# Patient Record
Sex: MALE | Race: WHITE | NOT HISPANIC OR LATINO | Employment: FULL TIME | URBAN - METROPOLITAN AREA
[De-identification: names, ages, dates, MRNs, and addresses within clinical notes are randomized per-mention and may not be internally consistent; named-entity substitution may affect disease eponyms.]

---

## 2023-10-02 PROBLEM — C44.329 SQUAMOUS CELL CARCINOMA OF SKIN OF OTHER PARTS OF FACE: Status: ACTIVE | Noted: 2023-08-02

## 2023-10-02 PROBLEM — L82.1 OTHER SEBORRHEIC KERATOSIS: Status: ACTIVE | Noted: 2023-08-02

## 2023-10-02 PROBLEM — C44.311 BASAL CELL CARCINOMA OF SKIN OF NOSE: Status: ACTIVE | Noted: 2023-08-02

## 2023-10-02 PROBLEM — D04.62 CARCINOMA IN SITU OF SKIN OF LEFT UPPER LIMB, INCLUDING SHOULDER: Status: ACTIVE | Noted: 2023-08-02

## 2023-10-02 PROBLEM — L81.4 OTHER MELANIN HYPERPIGMENTATION: Status: ACTIVE | Noted: 2023-08-02

## 2023-10-02 PROBLEM — D49.2 NEOPLASM OF UNSPECIFIED BEHAVIOR OF BONE, SOFT TISSUE, AND SKIN: Status: ACTIVE | Noted: 2023-08-02

## 2023-10-02 PROBLEM — Z85.828 PERSONAL HISTORY OF OTHER MALIGNANT NEOPLASM OF SKIN: Status: ACTIVE | Noted: 2023-08-02

## 2023-10-02 PROBLEM — D04.39 CARCINOMA IN SITU OF SKIN OF OTHER PARTS OF FACE: Status: ACTIVE | Noted: 2023-08-02

## 2023-10-02 PROBLEM — D84.9 IMMUNOSUPPRESSION (MULTI): Status: ACTIVE | Noted: 2023-10-02

## 2023-10-02 PROBLEM — N52.9 ERECTILE DYSFUNCTION: Status: ACTIVE | Noted: 2023-10-02

## 2023-10-02 PROBLEM — L57.0 ACTINIC KERATOSIS: Status: ACTIVE | Noted: 2023-08-02

## 2023-10-02 PROBLEM — L90.5 SCAR CONDITION AND FIBROSIS OF SKIN: Status: ACTIVE | Noted: 2023-08-02

## 2023-10-02 PROBLEM — D18.01 HEMANGIOMA OF SKIN AND SUBCUTANEOUS TISSUE: Status: ACTIVE | Noted: 2023-08-02

## 2023-10-02 PROBLEM — B00.9 HSV-2 (HERPES SIMPLEX VIRUS 2) INFECTION: Status: ACTIVE | Noted: 2023-10-02

## 2023-10-02 PROBLEM — C43.59 MALIGNANT MELANOMA OF OTHER PART OF TRUNK (MULTI): Status: ACTIVE | Noted: 2023-08-02

## 2023-10-02 PROBLEM — L71.9 ROSACEA, UNSPECIFIED: Status: ACTIVE | Noted: 2023-08-02

## 2023-10-02 PROBLEM — C43.61 MALIGNANT MELANOMA OF RIGHT UPPER LIMB, INCLUDING SHOULDER (MULTI): Status: ACTIVE | Noted: 2023-08-02

## 2023-10-02 PROBLEM — L57.9 SKIN CHANGES DUE TO CHRONIC EXPOSURE TO NONIONIZING RADIATION, UNSPECIFIED: Status: ACTIVE | Noted: 2023-08-02

## 2023-10-02 RX ORDER — MUPIROCIN 20 MG/G
1 OINTMENT TOPICAL
COMMUNITY
Start: 2020-05-27 | End: 2023-10-11 | Stop reason: WASHOUT

## 2023-10-02 RX ORDER — VITS A,C,E/LUTEIN/MINERALS 300MCG-200
1 TABLET ORAL DAILY
COMMUNITY

## 2023-10-02 RX ORDER — FLUOCINONIDE 0.5 MG/G
OINTMENT TOPICAL 2 TIMES DAILY
COMMUNITY
Start: 2021-09-27

## 2023-10-02 RX ORDER — ATORVASTATIN CALCIUM 20 MG/1
1 TABLET, FILM COATED ORAL NIGHTLY
COMMUNITY
Start: 2018-03-07

## 2023-10-02 RX ORDER — VALACYCLOVIR HYDROCHLORIDE 500 MG/1
500 TABLET, FILM COATED ORAL
COMMUNITY
Start: 2020-11-17 | End: 2023-10-11 | Stop reason: WASHOUT

## 2023-10-02 RX ORDER — TACROLIMUS 0.5 MG/1
0.5 CAPSULE ORAL EVERY 12 HOURS
COMMUNITY
End: 2023-10-11 | Stop reason: SDUPTHER

## 2023-10-02 RX ORDER — DOXYCYCLINE 100 MG/1
100 CAPSULE ORAL
COMMUNITY
Start: 2019-07-05 | End: 2023-10-11 | Stop reason: WASHOUT

## 2023-10-02 RX ORDER — FLUOROURACIL 50 MG/G
1 CREAM TOPICAL
COMMUNITY
Start: 2018-03-08 | End: 2023-10-11 | Stop reason: WASHOUT

## 2023-10-02 RX ORDER — MYCOPHENOLATE MOFETIL 250 MG/1
750 CAPSULE ORAL EVERY 12 HOURS
COMMUNITY
Start: 2015-11-11 | End: 2023-10-11 | Stop reason: SDUPTHER

## 2023-10-02 RX ORDER — HUMAN INSULIN 100 [IU]/ML
5 INJECTION, SOLUTION SUBCUTANEOUS
COMMUNITY
Start: 2015-11-11

## 2023-10-02 RX ORDER — AMLODIPINE BESYLATE 10 MG/1
1 TABLET ORAL DAILY
COMMUNITY
Start: 2015-11-03 | End: 2023-10-11 | Stop reason: SDUPTHER

## 2023-10-02 RX ORDER — SILDENAFIL 100 MG/1
TABLET, FILM COATED ORAL AS NEEDED
COMMUNITY
Start: 2015-11-11 | End: 2023-10-11 | Stop reason: SDUPTHER

## 2023-10-02 RX ORDER — CARVEDILOL 25 MG/1
1 TABLET ORAL 2 TIMES DAILY
COMMUNITY
Start: 2016-05-24

## 2023-10-11 ENCOUNTER — APPOINTMENT (OUTPATIENT)
Dept: TRANSPLANT | Facility: CLINIC | Age: 73
End: 2023-10-11

## 2023-10-11 ENCOUNTER — OFFICE VISIT (OUTPATIENT)
Dept: TRANSPLANT | Facility: CLINIC | Age: 73
End: 2023-10-11
Payer: MEDICARE

## 2023-10-11 VITALS
DIASTOLIC BLOOD PRESSURE: 62 MMHG | WEIGHT: 183.6 LBS | OXYGEN SATURATION: 98 % | TEMPERATURE: 97.8 F | BODY MASS INDEX: 27.92 KG/M2 | SYSTOLIC BLOOD PRESSURE: 105 MMHG | HEART RATE: 50 BPM

## 2023-10-11 DIAGNOSIS — Z94.0 KIDNEY REPLACED BY TRANSPLANT (HHS-HCC): Primary | ICD-10-CM

## 2023-10-11 DIAGNOSIS — Z94.0 IMMUNOSUPPRESSIVE MANAGEMENT ENCOUNTER FOLLOWING KIDNEY TRANSPLANT (HHS-HCC): ICD-10-CM

## 2023-10-11 DIAGNOSIS — N52.9 ERECTILE DYSFUNCTION, UNSPECIFIED ERECTILE DYSFUNCTION TYPE: ICD-10-CM

## 2023-10-11 DIAGNOSIS — Z79.899 IMMUNOSUPPRESSIVE MANAGEMENT ENCOUNTER FOLLOWING KIDNEY TRANSPLANT (HHS-HCC): ICD-10-CM

## 2023-10-11 DIAGNOSIS — I10 ESSENTIAL HYPERTENSION: ICD-10-CM

## 2023-10-11 PROCEDURE — 1126F AMNT PAIN NOTED NONE PRSNT: CPT | Performed by: INTERNAL MEDICINE

## 2023-10-11 PROCEDURE — 3066F NEPHROPATHY DOC TX: CPT | Performed by: INTERNAL MEDICINE

## 2023-10-11 PROCEDURE — 99214 OFFICE O/P EST MOD 30 MIN: CPT | Performed by: INTERNAL MEDICINE

## 2023-10-11 PROCEDURE — 1036F TOBACCO NON-USER: CPT | Performed by: INTERNAL MEDICINE

## 2023-10-11 PROCEDURE — 1159F MED LIST DOCD IN RCRD: CPT | Performed by: INTERNAL MEDICINE

## 2023-10-11 PROCEDURE — 3078F DIAST BP <80 MM HG: CPT | Performed by: INTERNAL MEDICINE

## 2023-10-11 PROCEDURE — 3074F SYST BP LT 130 MM HG: CPT | Performed by: INTERNAL MEDICINE

## 2023-10-11 RX ORDER — SILDENAFIL 100 MG/1
100 TABLET, FILM COATED ORAL DAILY PRN
Qty: 30 TABLET | Refills: 3 | Status: SHIPPED | OUTPATIENT
Start: 2023-10-11 | End: 2024-10-10

## 2023-10-11 RX ORDER — TACROLIMUS 0.5 MG/1
0.5 CAPSULE ORAL EVERY 12 HOURS
Qty: 180 CAPSULE | Refills: 3 | Status: SHIPPED | OUTPATIENT
Start: 2023-10-11 | End: 2024-10-10

## 2023-10-11 RX ORDER — AMLODIPINE BESYLATE 10 MG/1
10 TABLET ORAL DAILY
Qty: 90 TABLET | Refills: 3 | Status: SHIPPED | OUTPATIENT
Start: 2023-10-11 | End: 2024-10-10

## 2023-10-11 RX ORDER — MYCOPHENOLATE MOFETIL 250 MG/1
750 CAPSULE ORAL EVERY 12 HOURS
Qty: 540 CAPSULE | Refills: 3 | Status: SHIPPED | OUTPATIENT
Start: 2023-10-11 | End: 2024-10-10

## 2023-10-11 ASSESSMENT — ENCOUNTER SYMPTOMS
DEPRESSION: 0
OCCASIONAL FEELINGS OF UNSTEADINESS: 0
LOSS OF SENSATION IN FEET: 0

## 2023-10-11 NOTE — PROGRESS NOTES
TRANSPLANT NEPHROLOGY :   OUTPATIENT CLINIC NOTE        SERVICE DATE: 10/11/2023   SERVICE TIME:  10:19 AM    REASON FOR VISIT/CHIEF COMPLAINT:  S/P  TRANSPLANT SURGERY  IMMUNOSUPPRESSIVE MEDICATION MANAGEMENT  BLOOD PRESSURE MANAGEMENT    HPI:    Mr. Smith is a 73 y.o. male with past medical history significant for diabetes, hypertension ,dyslipidemia and End Stage Renal Disease secondary to presumed diabetic nephropathy and hypertensive nephrosclerosis. Patient underwent a living unrelated renal transplant on 7/11/2013. Baseline creatinine had been around 1.3-1.6.     Patient recently diagnosed with melanoma at his back s/p surgery. He is due for 2nd MOH surgery in September.     Patient is doing well overall. No new complaints. Denied chest pain, SOB, MEDRANO, Palpitation. Normal urination and bowel movement. Normal gait and no weakness of arms/legs. No cough, runny nose, sore throat, cold symptoms, or rash. No hearing loss. Normal vision.No problems with his sleep, mood and function. No recent infection, hospitalization, surgery or ER visits.      ROS:  Review of  14 systems was performed system by system. See HPI. Otherwise, the symptoms were negative.    PAST MEDICAL HISTORY:  No past medical history on file.     PAST SURGICAL HISTORY:  Past Surgical History:   Procedure Laterality Date    US GUIDED ASPIRATION OF ABSCESS, HEMATOMA, CYST  7/29/2013    US GUIDED ASPIRATION OF ABSCESS, HEMATOMA, CYST 7/29/2013 [facility] [place]    US GUIDED ASPIRATION OF ABSCESS, HEMATOMA, CYST  7/29/2013    US GUIDED ASPIRATION OF ABSCESS, HEMATOMA, CYST 7/29/2013 [facility] [place] [facility]        SOCIAL HISTORY:  Social History     Socioeconomic History    Marital status: Single     Spouse name: Not on file    Number of children: Not on file    Years of education: Not on file    Highest education level: Not on file   Occupational History    Not on file   Tobacco Use    Smoking status: Never    Smokeless tobacco: Never   Substance and  Sexual Activity    Alcohol use: Not Currently    Drug use: Never    Sexual activity: Not on file   Other Topics Concern    Not on file   Social History Narrative    Not on file     Social Determinants of Health     Financial Resource Strain: Not on file   Food Insecurity: Not on file   Transportation Needs: Not on file   Physical Activity: Not on file   Stress: Not on file   Social Connections: Not on file   Intimate Partner Violence: Not on file   Housing Stability: Not on file       FAMILY HISTORY:  No family history on file.    MEDICATION LIST:  Current Outpatient Medications   Medication Instructions    amLODIPine (Norvasc) 10 mg tablet 1 tablet, oral, Daily    atorvastatin (Lipitor) 20 mg tablet 1 tablet, oral, Nightly    carvedilol (Coreg) 25 mg tablet 1 tablet, oral, 2 times daily    doxycycline (MONODOX) 100 mg    fluocinonide (Lidex) 0.05 % ointment Topical, 2 times daily,  APPLY SPARINGLY TO AFFECTED AREA(S)     fluorouracil (Efudex) 5 % cream 1 Application    mupirocin (Bactroban) 2 % ointment 1 Application    mycophenolate (CELLCEPT) 750 mg, oral, Every 12 hours    NovoLIN R Regular U100 Insulin 5 Units, subcutaneous, 3 times daily before meals, plus sliding scale    sildenafil (Viagra) 100 mg tablet oral, As needed    tacrolimus (PROGRAF) 0.5 mg, oral, Every 12 hours    valACYclovir (VALTREX) 500 mg    vit A,C and E-lutein-minerals (Ocuvite with Lutein) 300 mcg-200 mg-27 mg-2 mg tablet 1 tablet, oral, Daily         PHYSICAL EXAM:    Visit Vitals  /62   Pulse 50   Temp 36.6 °C (97.8 °F) (Temporal)   Wt 83.3 kg (183 lb 9.6 oz)   SpO2 98%   BMI 27.92 kg/m²   Smoking Status Never   BSA 2 m²          Vital signs - reviewed. Acceptable BP at this office visit.   General Appearance - NAD, Good speech, oriented and alert  HEENT - Supple. Not pale. No jaundice. No cervical lymphadenopathy. Pharynx and tonsils are not injected.  CVS - RRR. Normal S1/S2. No murmur, click , rub or gallop  Lungs- clear to  "auscultation bilaterally  Abdomen - soft , not tender, no guarding, no rigidity. No hepatosplenomegaly. Normal bowel sounds. No masses and ascites. S/P Kidney transplant .  Transplanted kidney is not tender.   Musculoskeletal /Extremities - no edema. Full ROM. No joint tenderness.   Neuro/Psych - appropriate mood and affect. Motor power V/V all extremities. CN I -XII were grossly intact.  Skin - No visible rash      LABS:    No results found for: \"WBC\", \"HGB\", \"HCT\", \"PLT\", \"CHOL\", \"TRIG\", \"HDL\", \"LDLDIRECT\", \"ALT\", \"AST\", \"NA\", \"K\", \"CL\", \"CREATININE\", \"BUN\", \"CO2\", \"TSH\", \"PSA\", \"INR\", \"GLUF\", \"HGBA1C\", \"ALBUR\"  par    ASSESSMENT AND PLAN:    Mr. Smith is a 73 y.o. male  who is here for follow up s/p kidney transplant.    TRANSPLANT DATE: 7/11/2013 (Kidney)      1. ESRD S/P kidney transplant   - Creatinine last check was :  No results found for: \"CREATININE\"  Creatinine clearance cannot be calculated (No successful lab value found.)    - Renal allograft function is stable, with Cr 1.4 per patient report. He did lab last month, but no result available in Care Everywhere. Will follow up the result.   -Random urine protein/creatinine ratio -done yesterday. Will follow up the result.  -Ensure adequate hydration  - Avoid nephrotoxic medications, NSAIDs, and IV contrast.    2. Immunosuppression  -Tacrolimus level last check was 5.4  -Continue current immunosuppression regimen.    3. Electrolytes  No results found for: \"GLUCOSE\", \"CALCIUM\", \"NA\", \"K\", \"CO2\", \"CL\", \"BUN\", \"CREATININE\"  -Acceptable from last lab drawn    4. Hypertension  Blood Pressures         10/11/2023  1004             BP: 105/62          -Home  BP had been acceptable  -Encourage to monitor home BP  -Continue current anti hypertensive medication    5. Bone Mineral Disease/Osteoporosis  -check VIT D, PTH with next lab  - Consider DEXA every 2-3 years , defer to PCP    6.Anemia  No results found for: \"WBC\", \"HGB\", \"HCT\", \"MCV\", \"PLT\"  -asymptomatic  - " Continue to monitor  -check iron studies and ferritin. Will consider LESTER as needed.  - No indications for blood transfusion     7.Health maintenance and vaccination  - Flu shot during flu season annually  - Cancer screening is up to date per the patient    Lab : Routine transplant lab ( CBC, RFP, and anti-rejection trough level ) every 3 months  Additional labs:  VIT D, PTH with next lab    Additional Plan :  He did lab last month, but no result available in Care Everywhere. Will follow up the result.   -Random urine protein/creatinine ratio -done yesterday. Will follow up the result.    RTC 12  months    Yolanda Painting    Transplant Nephrology

## 2023-11-15 ENCOUNTER — OFFICE VISIT (OUTPATIENT)
Dept: DERMATOLOGY | Facility: CLINIC | Age: 73
End: 2023-11-15
Payer: MEDICARE

## 2023-11-15 VITALS — HEART RATE: 64 BPM | DIASTOLIC BLOOD PRESSURE: 57 MMHG | SYSTOLIC BLOOD PRESSURE: 113 MMHG

## 2023-11-15 DIAGNOSIS — C44.42 SQUAMOUS CELL CARCINOMA OF SKIN OF SCALP AND NECK: ICD-10-CM

## 2023-11-15 PROCEDURE — 3066F NEPHROPATHY DOC TX: CPT | Performed by: DERMATOLOGY

## 2023-11-15 PROCEDURE — 1159F MED LIST DOCD IN RCRD: CPT | Performed by: DERMATOLOGY

## 2023-11-15 PROCEDURE — 99214 OFFICE O/P EST MOD 30 MIN: CPT | Performed by: DERMATOLOGY

## 2023-11-15 PROCEDURE — 3078F DIAST BP <80 MM HG: CPT | Performed by: DERMATOLOGY

## 2023-11-15 PROCEDURE — 3074F SYST BP LT 130 MM HG: CPT | Performed by: DERMATOLOGY

## 2023-11-15 PROCEDURE — 17311 MOHS 1 STAGE H/N/HF/G: CPT | Performed by: DERMATOLOGY

## 2023-11-15 PROCEDURE — 1126F AMNT PAIN NOTED NONE PRSNT: CPT | Performed by: DERMATOLOGY

## 2023-11-15 PROCEDURE — 1036F TOBACCO NON-USER: CPT | Performed by: DERMATOLOGY

## 2023-11-15 NOTE — PROGRESS NOTES
Office Visit Note  Date: 11/15/2023  Surgeon:  Rianer Rob MD  Office Location: [address]   [address]  [facility] [address]  Dept: [phone]  Dept Fax: [phone]  Referring Provider: Harry Alvarez MD  [id number]  [place] [address]    Subjective   Rich Smith is a 73 y.o. male who presents for the following: MOHS Surgery    According to the patient, the lesion has been presnt for approximately greater than 1 year at the time of diagnosis.  The lesion is not causing symptoms.  The lesion has not been treated previously.    The patient does not have a pacemaker / defibrillator.  The patient does not have a heart valve / joint replacement.    The patient is not on blood thinners.  The patient does not have a history of hepatitis B or C.  The patient does not have a history of HIV.    Review of Systems:  No other skin or systemic complaints other than what is documented elsewhere in the note.    MEDICAL HISTORY: clinically relevant history including significant past medical history, medications and allergies was reviewed and documented in Epic.    Objective   Well appearing patient in no apparent distress; mood and affect are within normal limits.  Vital signs: See record.  Noted on the     The patient confirmed the identified site.    Discussion:  The nature of the diagnosis was explained. The lesion is a skin cancer.  It has a risk of local growth and distant spread. The condition is associated with sun exposure.  Warning signs of non-melanoma skin cancer discussed. Patient was instructed to perform monthly self skin examination.  We recommended that the patient have regular full skin exams given an increased risk of subsequent skin cancers. The patient was instructed to use sun protective behaviors including use of broad spectrum sunscreens and sun protective clothing to reduce risk of skin cancers.      Risks, benefits,  side effects of Mohs surgery were discussed with patient and the patient voiced understanding.  It was explained that even though the cure rate of Mohs is very high it is not 100%. Risks of surgery including but not limited to bleeding, infection, numbness, nerve damage, and scar were reviewed.  Discussion included wound care requirements, activity restrictions, likely scar outcome and time to heal.     After Mohs surgery, the defect may need to be repaired surgically and the scar may be longer than the original lesion.  Reconstruction options, risks, and benefits were reviewed including second intention healing, linear repair (4-1 ratio was explained), local flaps, skin grafts, cartilage grafts and interpolation flaps (the need for multiple surgeries was explained). Possible outcomes were reviewed including likely scar appearance, failure of flap survival, infection, bleeding and the need for revision surgery.     The pathology was reviewed, the photograph was reviewed, and the referring physician's note was reviewed.    Patient elected for Mohs surgery.    The patient has a squamous cell carcinoma.  The pathology was reviewed, the photograph was reviewed, and the referring physicians note was reviewed.  Multiple treatment options including mohs surgery (which has moderate risk of morbidity) were reviewed.    Medical Decision Making  Column 1- Squamous Cell Carcinoma (1 acute uncomplicated illness- Low)  Column 2- 3 tests reviewed (pathology, photograph, referring physician notes- Moderate)  Column 3- Modertate risk of morbidity from additional treatment- mohs surgry- Moderate)    Overall Moderate MDM

## 2023-11-15 NOTE — PROGRESS NOTES
Mohs Surgery Operative Note    Date of Surgery:  11/15/2023  Surgeon:  Rainer Rob MD  Office Location: [address]   [address] [address]  Dept: [phone]  Dept Fax: [phone]  Referring Provider: Harry Alvarez MD  [id number]  [place] [address]      Assessment/Plan   Pre-procedure:   Obtained informed consent: written from patient  The surgical site was identified and confirmed with the patient.     Intra-operative:   Audible time out called at : 2:31 PM 11/15/23  by: Stacey Stearns RN   Verified patient name, birthdate, site, specimen bottle label & requisition.    The planned procedure(s) was again reviewed with the patient. The risks of bleeding, infection, nerve damage and scarring were reviewed. Written authorization was obtained. The patient identity, surgical site, and planned procedure(s) were verified. The provider acted as both surgeon and pathologist.     Squamous cell carcinoma of skin of scalp and neck  Left Vertex Scalp    Mohs surgery    Consent obtained: written    Universal Protocol:  Procedure explained and questions answered to patient or proxy's satisfaction: Yes    Test results available and properly labeled: Yes    Pathology report reviewed: Yes    External notes reviewed: Yes    Photo or diagram used for site identification: Yes    Site/side marked: Yes    Slide independently reviewed by Mohs surgeon: Yes    Immediately prior to procedure a time out was called: Yes    Patient identity confirmed: verbally with patient  Preparation: Patient was prepped and draped in usual sterile fashion      Anticoagulation:  Is the patient taking prescription anticoagulant and/or aspirin prescribed/recommended by a physician? No    Was the anticoagulation regimen changed prior to Mohs? No      Anesthesia:  Anesthesia method: local infiltration  Local anesthetic: lidocaine 2% WITH epi    Procedure  Details:  Biopsy accession number: [id number]  Date of biopsy: 4/18/2023  Pre-Op diagnosis: squamous cell carcinoma  SCC subtype: moderately differentiated  Surgery side: left  Surgical site (from skin exam): Left Vertex Scalp  Pre-operative length (cm): 1.5  Pre-operative width (cm): 1.3  Indications for Mohs surgery: anatomic location where tissue conservation is critical    Micrographic Surgery Details:  Post-operative length (cm): 1.8  Post-operative width (cm): 1.6  Number of Mohs stages: 1    Stage 1     Comments: The patient was brought into the operating room and placed in the procedure chair in the appropriate position.  The area positive by previous biopsy was identified and confirmed with the patient. The area of clinically obvious tumor was debulked using a curette and/or scalpel as needed. An incision was made following the Mohs approach through the skin. The specimen was taken to the lab, divided into 2 piece(s) and appropriately chromacoded and processed.             Tumor features identified on Mohs section: no tumor identified    Depth of defect: subcutaneous fat    Patient tolerance of procedure: tolerated well, no immediate complications    Reconstruction:  Was the defect reconstructed?: No    Fine/surface layer approximation (top stitches)   Post-procedure details: sterile dressing applied and wound care instructions given                  Wound care was discussed, and the patient was given written post-operative wound care instructions.      The patient will follow up with Rainer Rob MD as needed for any post operative problems or concerns, and will follow up with their primary dermatologist as scheduled.

## 2023-11-28 ENCOUNTER — OFFICE VISIT (OUTPATIENT)
Dept: DERMATOLOGY | Facility: CLINIC | Age: 73
End: 2023-11-28
Payer: MEDICARE

## 2023-11-28 DIAGNOSIS — C44.329 SQUAMOUS CELL CANCER OF SKIN OF LEFT CHEEK: Primary | ICD-10-CM

## 2023-11-28 PROCEDURE — 99214 OFFICE O/P EST MOD 30 MIN: CPT | Performed by: DERMATOLOGY

## 2023-11-28 PROCEDURE — 1126F AMNT PAIN NOTED NONE PRSNT: CPT | Performed by: DERMATOLOGY

## 2023-11-28 PROCEDURE — 1159F MED LIST DOCD IN RCRD: CPT | Performed by: DERMATOLOGY

## 2023-11-28 PROCEDURE — 3066F NEPHROPATHY DOC TX: CPT | Performed by: DERMATOLOGY

## 2023-11-28 PROCEDURE — 13132 CMPLX RPR F/C/C/M/N/AX/G/H/F: CPT | Performed by: DERMATOLOGY

## 2023-11-28 PROCEDURE — 1036F TOBACCO NON-USER: CPT | Performed by: DERMATOLOGY

## 2023-11-28 PROCEDURE — 17311 MOHS 1 STAGE H/N/HF/G: CPT | Performed by: DERMATOLOGY

## 2023-11-28 NOTE — PROGRESS NOTES
Office Visit Note  Date: 11/28/2023  Surgeon:  Rainer Rob MD  Office Location: [address]   [address]  [facility] [address]  Dept: [phone]  Dept Fax: [phone]  Referring Provider: Harry Alvarez MD  [id number]  [place] [address]    Subjective   Rich Smith is a 73 y.o. male who presents for the following: MOHS Surgery    According to the patient, the lesion has been presnt for approximately 6 months at the time of diagnosis.  The lesion is not causing symptoms.  The lesion has not been treated previously.    The patient does not have a pacemaker / defibrillator.  The patient does not have a heart valve / joint replacement.    The patient is not on blood thinners.  The patient does not have a history of hepatitis B or C.  The patient does not have a history of HIV.    Review of Systems:  No other skin or systemic complaints other than what is documented elsewhere in the note.    MEDICAL HISTORY: clinically relevant history including significant past medical history, medications and allergies was reviewed and documented in Epic.    Objective   Well appearing patient in no apparent distress; mood and affect are within normal limits.  Vital signs: See record.  Noted on the Left lateral cheek  Is a 2.5 x 2.0 cm scar        The patient confirmed the identified site.    Discussion:  The nature of the diagnosis was explained. The lesion is a skin cancer.  It has a risk of local growth and distant spread. The condition is associated with sun exposure.  Warning signs of non-melanoma skin cancer discussed. Patient was instructed to perform monthly self skin examination.  We recommended that the patient have regular full skin exams given an increased risk of subsequent skin cancers. The patient was instructed to use sun protective behaviors including use of broad spectrum sunscreens and sun protective clothing to reduce risk of  skin cancers.      Risks, benefits, side effects of Mohs surgery were discussed with patient and the patient voiced understanding.  It was explained that even though the cure rate of Mohs is very high it is not 100%. Risks of surgery including but not limited to bleeding, infection, numbness, nerve damage, and scar were reviewed.  Discussion included wound care requirements, activity restrictions, likely scar outcome and time to heal.     After Mohs surgery, the defect may need to be repaired surgically and the scar may be longer than the original lesion.  Reconstruction options, risks, and benefits were reviewed including second intention healing, linear repair (4-1 ratio was explained), local flaps, skin grafts, cartilage grafts and interpolation flaps (the need for multiple surgeries was explained). Possible outcomes were reviewed including likely scar appearance, failure of flap survival, infection, bleeding and the need for revision surgery.     The pathology was reviewed, the photograph was reviewed, and the referring physician's note was reviewed.    Patient elected for Mohs surgery.    The patient has a squamous cell carcinoma.  The pathology was reviewed, the photograph was reviewed, and the referring physicians note was reviewed.  Multiple treatment options including mohs surgery (which has moderate risk of morbidity) were reviewed.    Medical Decision Making  Column 1- Squamous Cell Carcinoma (1 acute uncomplicated illness- Low)  Column 2- 3 tests reviewed (pathology, photograph, referring physician notes- Moderate)  Column 3- Modertate risk of morbidity from additional treatment- mohs surgry- Moderate)    Overall Moderate MDM

## 2023-11-29 NOTE — PROGRESS NOTES
Mohs Surgery Operative Note    Date of Surgery:  11/28/2023  Surgeon:  Rainer Rob MD  Office Location: [address]   [address] [address]  Dept: [phone]  Dept Fax: [phone]  Referring Provider: Harry Alvarez MD  [id number]  [place] [address]      Assessment/Plan   Pre-procedure:   Obtained informed consent: written from patient  The surgical site was identified and confirmed with the patient.     Intra-operative:   Audible time out called at : 7:49 AM 11/29/23  by: Carol Fan MA   Verified patient name, birthdate, site, specimen bottle label & requisition.    The planned procedure(s) was again reviewed with the patient. The risks of bleeding, infection, nerve damage and scarring were reviewed. Written authorization was obtained. The patient identity, surgical site, and planned procedure(s) were verified. The provider acted as both surgeon and pathologist.     Squamous cell carcinoma of skin  Left lateral cheek    Mohs surgery    Consent obtained: written    Universal Protocol:  Procedure explained and questions answered to patient or proxy's satisfaction: Yes    Test results available and properly labeled: Yes    Pathology report reviewed: Yes    External notes reviewed: Yes    Photo or diagram used for site identification: Yes    Site/side marked: Yes    Slide independently reviewed by Mohs surgeon: Yes    Immediately prior to procedure a time out was called: Yes    Patient identity confirmed: verbally with patient  Preparation: Patient was prepped and draped in usual sterile fashion      Anticoagulation:  Is the patient taking prescription anticoagulant and/or aspirin prescribed/recommended by a physician? No    Was the anticoagulation regimen changed prior to Mohs? No      Anesthesia:  Anesthesia method: local infiltration  Local anesthetic: lidocaine 2% WITH epi    Procedure Details:  Biopsy accession  number: [id number]  Date of biopsy: 4/18/2023  Pre-Op diagnosis: squamous cell carcinoma  SCC subtype: in situ  Surgery side: left  Surgical site (from skin exam): Left lateral cheek  Pre-operative length (cm): 2.5  Pre-operative width (cm): 2  Indications for Mohs surgery: anatomic location where tissue conservation is critical  Previously treated? No      Micrographic Surgery Details:  Post-operative length (cm): 2.5  Post-operative width (cm): 2  Number of Mohs stages: 1    Stage 1     Comments: The patient was brought into the operating room and placed in the procedure chair in the appropriate position.  The area positive by previous biopsy was identified and confirmed with the patient. The area of clinically obvious tumor was debulked using a curette and/or scalpel as needed. An incision was made following the Mohs approach through the skin. The specimen was taken to the lab, divided into 2 piece(s) and appropriately chromacoded and processed.                 Tumor features identified on Mohs section: no tumor identified    Depth of defect: subcutaneous fat    Patient tolerance of procedure: tolerated well, no immediate complications    Reconstruction:  Was the defect reconstructed? Yes    Was reconstruction performed by the same Mohs surgeon? Yes    Setting of reconstruction: outpatient office  When was reconstruction performed? same day  Type of reconstruction: linear  Linear reconstruction: complex  Length of linear repair (cm): 7  Subcutaneous Layers (Deep Stitches)   Suture size:  3-0 and 4-0  Suture type:  Vicryl  Stitches:  Buried vertical mattress  Fine/surface layer approximation (top stitches)   Epidermal/Superficial suture size:  5-0  Epidermal/Superficial suture type:  Fast-absorbing gut  Stitches: simple running    Hemostasis achieved with: electrodesiccation  Outcome: patient tolerated procedure well with no complications    Post-procedure details: sterile dressing applied and wound care  instructions given    Dressing type: Telfa pad, petrolatum and pressure dressing      Staff Communication: Dermatology Local Anesthesia: Site Location:Left Lateral Cheek 2%Lidocaine / Epinephrine - Amount: 12 CC        Complex Linear Repair - Wide Undermining:  Given the location and size of the defect, it was determined that a complex layered linear closure was required to restore normal anatomy and function. The repair was considered complex because extensive undermining was required and performed. The amount of undermining performed was greater than the maximum width of the defect as measured perpendicular to the closure line along at least one entire edge of the defect. Standing cutaneous cones were removed using Burow's triangles. The wound edges were brought into close approximation with buried vertical mattress sutures. The remainder of the wound was then closed with epidermal top sutures.                    The final repair measured 7 cm    Wound care was discussed, and the patient was given written post-operative wound care instructions.      The patient will follow up with Rainer Rob MD as needed for any post operative problems or concerns, and will follow up with their primary dermatologist as scheduled.

## 2023-12-19 ENCOUNTER — OFFICE VISIT (OUTPATIENT)
Dept: DERMATOLOGY | Facility: CLINIC | Age: 73
End: 2023-12-19
Payer: MEDICARE

## 2023-12-19 DIAGNOSIS — L57.0 ACTINIC KERATOSIS: ICD-10-CM

## 2023-12-19 DIAGNOSIS — D18.01 HEMANGIOMA OF SKIN: ICD-10-CM

## 2023-12-19 DIAGNOSIS — L81.4 OTHER MELANIN HYPERPIGMENTATION: ICD-10-CM

## 2023-12-19 DIAGNOSIS — D22.5 MELANOCYTIC NEVI OF TRUNK: ICD-10-CM

## 2023-12-19 DIAGNOSIS — L82.1 SEBORRHEIC KERATOSIS: ICD-10-CM

## 2023-12-19 DIAGNOSIS — D48.5 NEOPLASM OF UNCERTAIN BEHAVIOR OF SKIN: Primary | ICD-10-CM

## 2023-12-19 PROCEDURE — 1159F MED LIST DOCD IN RCRD: CPT | Performed by: STUDENT IN AN ORGANIZED HEALTH CARE EDUCATION/TRAINING PROGRAM

## 2023-12-19 PROCEDURE — 88305 TISSUE EXAM BY PATHOLOGIST: CPT | Performed by: DERMATOLOGY

## 2023-12-19 PROCEDURE — 88305 TISSUE EXAM BY PATHOLOGIST: CPT | Mod: TC,DER | Performed by: STUDENT IN AN ORGANIZED HEALTH CARE EDUCATION/TRAINING PROGRAM

## 2023-12-19 PROCEDURE — 1126F AMNT PAIN NOTED NONE PRSNT: CPT | Performed by: STUDENT IN AN ORGANIZED HEALTH CARE EDUCATION/TRAINING PROGRAM

## 2023-12-19 PROCEDURE — 1036F TOBACCO NON-USER: CPT | Performed by: STUDENT IN AN ORGANIZED HEALTH CARE EDUCATION/TRAINING PROGRAM

## 2023-12-19 PROCEDURE — 17004 DESTROY PREMAL LESIONS 15/>: CPT | Performed by: STUDENT IN AN ORGANIZED HEALTH CARE EDUCATION/TRAINING PROGRAM

## 2023-12-19 PROCEDURE — 3066F NEPHROPATHY DOC TX: CPT | Performed by: STUDENT IN AN ORGANIZED HEALTH CARE EDUCATION/TRAINING PROGRAM

## 2023-12-19 PROCEDURE — 11102 TANGNTL BX SKIN SINGLE LES: CPT | Performed by: STUDENT IN AN ORGANIZED HEALTH CARE EDUCATION/TRAINING PROGRAM

## 2023-12-19 PROCEDURE — 99214 OFFICE O/P EST MOD 30 MIN: CPT | Performed by: STUDENT IN AN ORGANIZED HEALTH CARE EDUCATION/TRAINING PROGRAM

## 2023-12-19 NOTE — PROGRESS NOTES
Subjective     Rich Smith is a 73 y.o. male who presents for the following: Skin Check (FBSE). He has no lesions of concern    Review of Systems:  No other skin or systemic complaints other than what is documented elsewhere in the note.    The following portions of the chart were reviewed this encounter and updated as appropriate:         Skin Cancer History  Melanoma, most recently 5/2023  Multiple NMSC        Specialty Problems          Dermatology Problems    Basal cell carcinoma (BCC) in situ of skin    Malignant melanoma of skin (CMS/HCC)    Actinic keratosis    Carcinoma in situ of skin of left upper limb, including shoulder    Carcinoma in situ of skin of other parts of face    Hemangioma of skin and subcutaneous tissue    Malignant melanoma of other part of trunk (CMS/HCC)    Malignant melanoma of right upper limb, including shoulder (CMS/HCC)    Other melanin hyperpigmentation    Other seborrheic keratosis    Personal history of other malignant neoplasm of skin    Rosacea, unspecified    Scar condition and fibrosis of skin    Skin changes due to chronic exposure to nonionizing radiation, unspecified    Squamous cell carcinoma of skin of other parts of face        Objective   Well appearing patient in no apparent distress; mood and affect are within normal limits.    A full examination was performed including scalp, head, eyes, ears, nose, lips, neck, chest, axillae, abdomen, back, buttocks, bilateral upper extremities, bilateral lower extremities, hands, feet, fingers, toes, fingernails, and toenails. All findings within normal limits unless otherwise noted below.    Assessment/Plan   1. Neoplasm of uncertain behavior of skin  Right Eyebrow  Shiny pink papule on right lateral eyebrow          Lesion biopsy  Type of biopsy: tangential    Informed consent: discussed and consent obtained    Timeout: patient name, date of birth, surgical site, and procedure verified    Procedure prep:  Patient was prepped  and draped  Anesthesia: the lesion was anesthetized in a standard fashion    Anesthetic:  1% lidocaine w/ epinephrine 1-100,000 local infiltration  Instrument used: DermaBlade    Hemostasis achieved with: aluminum chloride    Outcome: patient tolerated procedure well    Post-procedure details: sterile dressing applied and wound care instructions given    Dressing type: petrolatum and bandage      Specimen 1 - Dermatopathology- DERM LAB  Differential Diagnosis: r/o BCC  Check Margins Yes/No?:    Comments:    Dermpath Lab: No DIF, Routine Histopath, B-Cell or T-Cell Gene Rearrangement    2. Actinic keratosis (20)  Forearms (10), Scalp (5), Temple/Forehead (5)  Erythematous macules with gritty scale.    Destr of lesion - Forearms (2), Scalp, Temple/Forehead  Complexity: simple    Destruction method: cryotherapy    Informed consent: discussed and consent obtained    Lesion destroyed using liquid nitrogen: Yes    Cryotherapy cycles:  1  Outcome: patient tolerated procedure well with no complications    Post-procedure details: wound care instructions given     3. Seborrheic keratosis, Hemangioma of skin, Lentigo, Multiple benign melanocytic nevi  Stuck on verrucous, tan-brown papules and plaques.  Scattered cherry-red papule(s).  Scattered tan macules in sun-exposed areas.  Regular brown macules  Plan:  -Skin exam today notable for the following findings: seborrheic keratosis, lentigines, cherry angiomas, numerous nevi  - ABCDEs of melanoma were discussed and the ugly duckling sign. The appearance of non-melanoma skin cancers was also discussed.  - We recommend daily use of broad spectrum (UVA + UVB protection) sunscreen , with an SPF of 30 or higher. Reapply sunscreen frequently (at least every 2 hours), especially when outdoors or when active, and apply more than you think you need. Waterproof or sweatproof suscreens are important if you will be spending time swimming or doing strenuous activites outside. In addition,  we recommend avoidance of mid-day sun, during the hours of 10am to 3pm, and seeking shade when possible. The use of sun protective clothing, such as a wide-brimmed hat, is also recommended.    RTC in 4 months    Nicanor Harley MD  PGY-4 Dermatology      I was present during all key portions of visit including history, exam, discussion/plan and/or procedures and directly supervised our resident during all portions of the visit, follow up care, medications and more    Harry Alvarez MD

## 2023-12-21 LAB
LABORATORY COMMENT REPORT: NORMAL
PATH REPORT.FINAL DX SPEC: NORMAL
PATH REPORT.GROSS SPEC: NORMAL
PATH REPORT.MICROSCOPIC SPEC OTHER STN: NORMAL
PATH REPORT.RELEVANT HX SPEC: NORMAL
PATH REPORT.TOTAL CANCER: NORMAL

## 2024-03-05 ENCOUNTER — TELEPHONE (OUTPATIENT)
Dept: TRANSPLANT | Facility: HOSPITAL | Age: 74
End: 2024-03-05
Payer: MEDICARE

## 2024-03-05 DIAGNOSIS — N18.30 STAGE 3 CHRONIC KIDNEY DISEASE, UNSPECIFIED WHETHER STAGE 3A OR 3B CKD (MULTI): ICD-10-CM

## 2024-03-05 DIAGNOSIS — E08.22 DIABETES MELLITUS DUE TO UNDERLYING CONDITION WITH DIABETIC CHRONIC KIDNEY DISEASE, UNSPECIFIED CKD STAGE, UNSPECIFIED WHETHER LONG TERM INSULIN USE (MULTI): ICD-10-CM

## 2024-03-05 DIAGNOSIS — Z94.0 KIDNEY REPLACED BY TRANSPLANT (HHS-HCC): ICD-10-CM

## 2024-03-05 NOTE — TELEPHONE ENCOUNTER
Received call from [facility] requesting labs faxed over for patient  Fax: [phone] labs faxed

## 2024-03-27 LAB
ALBUMIN (G/DL) IN SER/PLAS EXTERNAL: 4.2 G/DL
ANION GAP IN SER/PLAS EXTERNAL: 11 MMOL/L
CALCIUM (MG/DL) IN SER/PLAS EXTERNAL: 9.2 MG/DL
CARBON DIOXIDE, TOTAL (MMOL/L) IN SER/PLAS EXTERNAL: 26 MMOL/L
CHLORIDE (MMOL/L) IN SER/PLAS EXTERNAL: 109 MMOL/L
CREATININE (MG/DL) IN SER/PLAS EXTERNAL: 1.4 MG/DL
ERYTHROCYTE DISTRIBUTION WIDTH (RATIO) BY AUTOMATED COUNT EXTERNAL: NORMAL
ERYTHROCYTE MEAN CORPUSCULAR HEMOGLOBIN (PG) BY AUTOMATED COUNT EXTERNAL: NORMAL
ERYTHROCYTE MEAN CORPUSCULAR HGB CONCENTRATION (G/DL) BY AUTOMATED EXT: NORMAL
ERYTHROCYTE MEAN CORPUSCULAR VOLUME (FL) BY AUTOMATED COUNT EXTERNAL: NORMAL
ERYTHROCYTES (10*6/UL) IN BLOOD BY AUTOMATED COUNT EXTERNAL: NORMAL
GLOMERULAR FILTRATION RATE ML/MIN/1.73 SQ M.PREDICTED EXTERNAL: 53 ML/MIN/1.73M*2
GLUCOSE (MG/DL) IN SER/PLAS EXTERNAL: 85 MG/DL
HEMATOCRIT (%) IN BLOOD BY AUTOMATED COUNT EXTERNAL: 42.7 %
HEMOGLOBIN (G/DL) IN BLOOD EXTERNAL: 14.5 G/DL
LEUKOCYTES (10*3/UL) IN BLOOD BY AUTOMATED COUNT EXTERNAL: NORMAL
NRBC (PER 100 WBCS) BY AUTOMATED COUNT EXTERNAL: NORMAL
PHOSPHORUS: 2.6
PLATELET MEAN VOLUME (FL) IN BLOOD BY AUTOMATED COUNT EXTERNAL: NORMAL
PLATELETS (10*3/UL) IN BLOOD AUTOMATED COUNT EXTERNAL: 194 X10*3/UL
POTASSIUM (MMOL/L) IN SER/PLAS EXTERNAL: 4.4 MMOL/L
RBC: 5.02
SODIUM (MMOL/L) IN SER/PLAS EXTERNAL: 142 MMOL/L
UREA NITROGEN (MG/DL) IN SER/PLAS EXTERNAL: 26 MG/DL
WBC: 6.9

## 2024-04-16 ENCOUNTER — OFFICE VISIT (OUTPATIENT)
Dept: DERMATOLOGY | Facility: CLINIC | Age: 74
End: 2024-04-16
Payer: MEDICARE

## 2024-04-16 DIAGNOSIS — L85.3 XEROSIS CUTIS: ICD-10-CM

## 2024-04-16 DIAGNOSIS — L57.0 ACTINIC KERATOSIS: Primary | ICD-10-CM

## 2024-04-16 DIAGNOSIS — L82.1 SEBORRHEIC KERATOSIS: ICD-10-CM

## 2024-04-16 DIAGNOSIS — D22.9 MULTIPLE BENIGN NEVI: ICD-10-CM

## 2024-04-16 PROCEDURE — 99213 OFFICE O/P EST LOW 20 MIN: CPT | Performed by: STUDENT IN AN ORGANIZED HEALTH CARE EDUCATION/TRAINING PROGRAM

## 2024-04-16 PROCEDURE — 1159F MED LIST DOCD IN RCRD: CPT | Performed by: STUDENT IN AN ORGANIZED HEALTH CARE EDUCATION/TRAINING PROGRAM

## 2024-04-16 PROCEDURE — 17004 DESTROY PREMAL LESIONS 15/>: CPT | Performed by: STUDENT IN AN ORGANIZED HEALTH CARE EDUCATION/TRAINING PROGRAM

## 2024-04-16 ASSESSMENT — DERMATOLOGY QUALITY OF LIFE (QOL) ASSESSMENT
DATE THE QUALITY-OF-LIFE ASSESSMENT WAS COMPLETED: 66946
RATE HOW BOTHERED YOU ARE BY EFFECTS OF YOUR SKIN PROBLEMS ON YOUR ACTIVITIES (EG, GOING OUT, ACCOMPLISHING WHAT YOU WANT, WORK ACTIVITIES OR YOUR RELATIONSHIPS WITH OTHERS): 0 - NEVER BOTHERED
RATE HOW BOTHERED YOU ARE BY SYMPTOMS OF YOUR SKIN PROBLEM (EG, ITCHING, STINGING BURNING, HURTING OR SKIN IRRITATION): 0 - NEVER BOTHERED
WHAT SINGLE SKIN CONDITION LISTED BELOW IS THE PATIENT ANSWERING THE QUALITY-OF-LIFE ASSESSMENT QUESTIONS ABOUT: NONE OF THE ABOVE
RATE HOW EMOTIONALLY BOTHERED YOU ARE BY YOUR SKIN PROBLEM (FOR EXAMPLE, WORRY, EMBARRASSMENT, FRUSTRATION): 0 - NEVER BOTHERED
ARE THERE EXCLUSIONS OR EXCEPTIONS FOR THE QUALITY OF LIFE ASSESSMENT: NO

## 2024-04-16 ASSESSMENT — PATIENT GLOBAL ASSESSMENT (PGA): PATIENT GLOBAL ASSESSMENT: PATIENT GLOBAL ASSESSMENT:  1 - CLEAR

## 2024-04-16 ASSESSMENT — ITCH NUMERIC RATING SCALE: HOW SEVERE IS YOUR ITCHING?: 0

## 2024-04-16 ASSESSMENT — DERMATOLOGY PATIENT ASSESSMENT
DO YOU HAVE ANY NEW OR CHANGING LESIONS: NO
DO YOU USE SUNSCREEN: OCCASIONALLY
DO YOU USE A TANNING BED: NO
HAVE YOU HAD OR DO YOU HAVE A STAPH INFECTION: NO
ARE YOU AN ORGAN TRANSPLANT RECIPIENT: YES
HAVE YOU HAD OR DO YOU HAVE VASCULAR DISEASE: NO

## 2024-04-16 NOTE — PROGRESS NOTES
Subjective     Rich Smith is a 73 y.o. male who presents for the following: Skin Check (FBSE, no areas of concern. Hx of squamous cell carcinoma).     No particular concerns today. Noted discussion of changing transplant meds as below     Review of Systems:  No other skin or systemic complaints other than what is documented elsewhere in the note.    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    Malignant melanoma of skin (Multi)    Actinic keratosis    Carcinoma in situ of skin of left upper limb, including shoulder    Carcinoma in situ of skin of other parts of face    Hemangioma of skin and subcutaneous tissue    Malignant melanoma of other part of trunk (Multi)    Malignant melanoma of right upper limb, including shoulder (Multi)    Other melanin hyperpigmentation    Other seborrheic keratosis    Personal history of other malignant neoplasm of skin    Rosacea, unspecified    Scar condition and fibrosis of skin    Skin changes due to chronic exposure to nonionizing radiation, unspecified    Squamous cell carcinoma of skin of other parts of face        Objective   Well appearing patient in no apparent distress; mood and affect are within normal limits.    A full examination was performed including scalp, head, eyes, ears, nose, lips, neck, chest, axillae, abdomen, back, buttocks, bilateral upper extremities, bilateral lower extremities, hands, feet, fingers, toes, fingernails, and toenails. All findings within normal limits unless otherwise noted below.    No lymphadenopathy in the cervical, mandiublar, occipital, supraclav, axillary, inguinal, popliteal basins.     Assessment/Plan   1. Actinic  keratosis  Scalp  Numerous Erythematous macules with gritty scale on the scalp and face and ears    Actinic Keratoses - face, scalp.  The pre-cancerous nature of these lesions and treatment options were discussed with the patient today.  At this time, I recommend treatment with liquid nitrogen cryotherapy.  The patient expressed understanding, is in agreement with this plan, and wishes to proceed with cryotherapy today. Total of 16 lesions frozen today  - We also discussed efficacy of Nicotinamide/Niacinamide for skin cancer (NMSC, [place]) prevention - discussed that evidence was mixed for this (NEJM 2015 vs 2023) but low risk of side effects (facial flushing) and that it was a vitamin; patient is willing to trial, discussed he could purchase OTC as his insurance does not appear to cover this  - Also had lengthy discussion - patient notes that he has heard about a new IV immunosuppressive medication that reduces the risk of skin cancer compared to tacrolimus (Sirolimus? But not in IV formulation). Discussed that from a skin standpoint we were certainly willing to trial sirolimus due to reduced NMSC and melanoma risk but many other potential side effects of switching immunosuppression, discussed that he should discuss with transplant team as they would be managing this, he understood and will discuss w/ his transplant team, I think it is reasonable as he has had 2 invasive melanomas and multiple NMSCs       Destr of lesion - Scalp  Complexity: simple    Destruction method: cryotherapy    Informed consent: discussed and consent obtained    Lesion destroyed using liquid nitrogen: Yes    Cryotherapy cycles:  1  Outcome: patient tolerated procedure well with no complications    Post-procedure details: wound care instructions given      Related Procedures  Follow Up In Dermatology - Established Patient    2. Multiple benign nevi  Scattered on the patient's face, neck, trunk, and extremities, there are multiple small, round-  to oval-shaped, brown-pigmented and pink-colored, symmetric, uniform-appearing macules and dome-shaped papules     Clinically benign- to slightly atypical-appearing nevi - the clinically benign- to slightly atypical-appearing nature of the patient's nevi was discussed with the patient today.  None of the patient's nevi meet threshold for biopsy today.  I emphasized the importance of performing monthly self-skin exams using the ABCDs of monitoring moles, which were reviewed with the patient today and an informational hand-out provided.  I also emphasized the importance of sun avoidance and sun protection with daily sunscreen use.  The patient expressed understanding and is in agreement with this plan.      3. Seborrheic keratosis  Stuck on verrucous, tan-brown papules and plaques.      Seborrheic Keratoses - the benign nature of these lesions was discussed with the patient today and reassurance provided.  No treatment is medically indicated for these lesions at this time.      4. Xerosis cutis  Fine, ashy, pale to white scale diffusely over skin.    Xerosis.  I emphasized the importance of dry, sensitive skin care, including the use of a mild soap, such as Dove, and frequent and aggressive moisturization, at least twice daily and immediately following showers or baths, with recommended over-the-counter moisturizing creams, such as Eucerin, Cetaphil, Cerave, or Aveeno, or Vaseline or Aquaphor ointments.  The patient was also provided an informational hand-out today containing further details on dry skin care.        Rtc 4 months    I was present during all key portions of visit including history, exam, discussion/plan and/or procedures and directly supervised our resident during all portions of the visit, follow up care, medications and more    Harry Alvarez MD

## 2024-08-20 ENCOUNTER — APPOINTMENT (OUTPATIENT)
Dept: DERMATOLOGY | Facility: CLINIC | Age: 74
End: 2024-08-20
Payer: MEDICARE

## 2024-08-20 DIAGNOSIS — D48.5 NEOPLASM OF UNCERTAIN BEHAVIOR OF SKIN: Primary | ICD-10-CM

## 2024-08-20 DIAGNOSIS — D22.9 MULTIPLE BENIGN NEVI: ICD-10-CM

## 2024-08-20 DIAGNOSIS — L57.0 ACTINIC KERATOSIS: ICD-10-CM

## 2024-08-20 DIAGNOSIS — L82.1 SEBORRHEIC KERATOSIS: ICD-10-CM

## 2024-08-20 ASSESSMENT — DERMATOLOGY QUALITY OF LIFE (QOL) ASSESSMENT
RATE HOW EMOTIONALLY BOTHERED YOU ARE BY YOUR SKIN PROBLEM (FOR EXAMPLE, WORRY, EMBARRASSMENT, FRUSTRATION): 0 - NEVER BOTHERED
RATE HOW BOTHERED YOU ARE BY SYMPTOMS OF YOUR SKIN PROBLEM (EG, ITCHING, STINGING BURNING, HURTING OR SKIN IRRITATION): 0 - NEVER BOTHERED
ARE THERE EXCLUSIONS OR EXCEPTIONS FOR THE QUALITY OF LIFE ASSESSMENT: NO
RATE HOW BOTHERED YOU ARE BY EFFECTS OF YOUR SKIN PROBLEMS ON YOUR ACTIVITIES (EG, GOING OUT, ACCOMPLISHING WHAT YOU WANT, WORK ACTIVITIES OR YOUR RELATIONSHIPS WITH OTHERS): 0 - NEVER BOTHERED
DATE THE QUALITY-OF-LIFE ASSESSMENT WAS COMPLETED: 67072

## 2024-08-20 ASSESSMENT — ITCH NUMERIC RATING SCALE: HOW SEVERE IS YOUR ITCHING?: 0

## 2024-08-20 ASSESSMENT — PATIENT GLOBAL ASSESSMENT (PGA): PATIENT GLOBAL ASSESSMENT: PATIENT GLOBAL ASSESSMENT:  1 - CLEAR

## 2024-08-20 ASSESSMENT — DERMATOLOGY PATIENT ASSESSMENT: DO YOU HAVE ANY NEW OR CHANGING LESIONS: YES

## 2024-08-20 NOTE — PROGRESS NOTES
Subjective     Rich Smith is a 73 y.o. male who presents for the following: Skin Check (Pt has Hx of BCC, SCC, Melanoma.).     PMH of melanoma, multiple NMSCs, here for TBSE last seen 4/2024. Kidney transplant in 2013, currently on tacrolimus and cellcept.     Review of Systems:  No other skin or systemic complaints other than what is documented elsewhere in the note.    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    Malignant melanoma of skin (Multi)    Actinic keratosis    Carcinoma in situ of skin of left upper limb, including shoulder    Carcinoma in situ of skin of other parts of face    Hemangioma of skin and subcutaneous tissue    Malignant melanoma of other part of trunk (Multi)    Malignant melanoma of right upper limb, including shoulder (Multi)    Other melanin hyperpigmentation    Other seborrheic keratosis    Personal history of other malignant neoplasm of skin    Rosacea, unspecified    Scar condition and fibrosis of skin    Skin changes due to chronic exposure to nonionizing radiation, unspecified    Squamous cell carcinoma of skin of other parts of face     Past Medical History:  Rich Smith  has no past medical history on file.    Past Surgical History:  Rich Smith  has a past surgical history that includes US guided aspiration of abscess, hematoma, cyst (7/29/2013) and US guided aspiration of abscess, hematoma, cyst (7/29/2013).    Family History:  Patient family history is not on file.    Social History:  Rich Smith  reports that he has never smoked. He has never used smokeless tobacco. He reports that he does not currently use alcohol. He reports that he  does not use drugs.    Allergies:  Patient has no known allergies.    Current Medications / CAM's:    Current Outpatient Medications:     amLODIPine (Norvasc) 10 mg tablet, Take 1 tablet (10 mg) by mouth once daily., Disp: 90 tablet, Rfl: 3    atorvastatin (Lipitor) 20 mg tablet, Take 1 tablet (20 mg) by mouth once daily at bedtime., Disp: , Rfl:     carvedilol (Coreg) 25 mg tablet, Take 1 tablet (25 mg) by mouth 2 times a day., Disp: , Rfl:     fluocinonide (Lidex) 0.05 % ointment, Apply topically twice a day.  APPLY SPARINGLY TO AFFECTED AREA(S), Disp: , Rfl:     insulin regular (NovoLIN R Regular U100 Insulin) 100 unit/mL injection, Inject 5 Units under the skin 3 times a day before meals. plus sliding scale, Disp: , Rfl:     mycophenolate (Cellcept) 250 mg capsule, Take 3 capsules (750 mg) by mouth every 12 hours., Disp: 540 capsule, Rfl: 3    sildenafil (Viagra) 100 mg tablet, Take 1 tablet (100 mg) by mouth once daily as needed for erectile dysfunction., Disp: 30 tablet, Rfl: 3    tacrolimus (Prograf) 0.5 mg capsule, Take 1 capsule (0.5 mg) by mouth every 12 hours., Disp: 180 capsule, Rfl: 3    vit A,C and E-lutein-minerals (Ocuvite with Lutein) 300 mcg-200 mg-27 mg-2 mg tablet, Take 1 tablet by mouth once daily., Disp: , Rfl:      Objective   Well appearing patient in no apparent distress; mood and affect are within normal limits.    A full examination was performed including scalp, head, eyes, ears, nose, lips, neck, chest, axillae, abdomen, back, buttocks, bilateral upper extremities, bilateral lower extremities, hands, feet, fingers, toes, fingernails, and toenails. All findings within normal limits unless otherwise noted below.    Assessment/Plan   1. Neoplasm of uncertain behavior of skin  Left Upper Back  1.1 cm pink, shiny plaque          Lesion biopsy  Type of biopsy: tangential    Informed consent: discussed and consent obtained    Timeout: patient name, date of birth, surgical site, and procedure  verified    Procedure prep:  Patient was prepped and draped  Anesthesia: the lesion was anesthetized in a standard fashion    Anesthetic:  1% lidocaine w/ epinephrine 1-100,000 local infiltration  Instrument used: DermaBlade    Hemostasis achieved with: aluminum chloride    Outcome: patient tolerated procedure well    Post-procedure details: sterile dressing applied and wound care instructions given    Dressing type: petrolatum and bandage      Specimen 1 - Dermatopathology- DERM LAB  Differential Diagnosis: superficial BCC vs BLK  Check Margins Yes/No?:    Comments:    Dermpath Lab: Routine Histopathology (formalin-fixed tissue)    2. Actinic keratosis (16)  Head - Anterior (Face) (15), Scalp  Scattered on the patient's face and scalp, there are multiple erythematous, gritty, scaly macules      Actinic Keratoses - face, scalp.  The pre-cancerous nature of these lesions and treatment options were discussed with the patient today.  At this time, werecommend treatment with liquid nitrogen cryotherapy.  The patient expressed understanding, is in agreement with this plan, and wishes to proceed with cryotherapy today.   - Previously discussed efficacy of Nicotinamide/Niacinamide for skin cancer (NMSC, [place]) prevention - discussed that evidence was mixed for this (NEJM 2015 vs 2023) but low risk of side effects (facial flushing) and that it was a vitamin - patient did not trial the medication and not interested at this time.    Destr of lesion - Head - Anterior (Face) (15)  Complexity: simple    Destruction method: cryotherapy    Informed consent: discussed and consent obtained    Lesion destroyed using liquid nitrogen: Yes    Region frozen until ice ball extended beyond lesion: Yes    Cryotherapy cycles:  1  Outcome: patient tolerated procedure well with no complications    Post-procedure details: wound care instructions given      Related Procedures  Follow Up In Dermatology - Established Patient    3. Seborrheic  keratosis  Stuck on verrucous, tan-brown papules and plaques.      Seborrheic Keratoses - the benign nature of these lesions was discussed with the patient today and reassurance provided.  No treatment is medically indicated for these lesions at this time.      4. Multiple benign nevi  Scattered on the patient's face, neck, trunk, and extremities, there are multiple small, round- to oval-shaped, brown-pigmented and pink-colored, symmetric, uniform-appearing macules and dome-shaped papules     Clinically benign- to slightly atypical-appearing nevi - the clinically benign- to slightly atypical-appearing nature of the patient's nevi was discussed with the patient today.  None of the patient's nevi meet threshold for biopsy today. We emphasized the importance of performing monthly self-skin exams using the ABCDs of monitoring moles, which were reviewed with the patient today and an informational hand-out provided. We also emphasized the importance of sun avoidance and sun protection with daily sunscreen use.  The patient expressed understanding and is in agreement with this plan.        Isidoro Calderon DO  PGY-4 Dermatology    I was present during all key portions of visit including history, exam, discussion/plan and/or procedures and directly supervised our resident during all portions of the visit, follow up care, medications and more    Harry Alvarez MD

## 2024-09-09 ENCOUNTER — TELEPHONE (OUTPATIENT)
Dept: TRANSPLANT | Facility: HOSPITAL | Age: 74
End: 2024-09-09
Payer: MEDICARE

## 2024-09-09 DIAGNOSIS — Z12.5 SCREENING PSA (PROSTATE SPECIFIC ANTIGEN): ICD-10-CM

## 2024-09-09 DIAGNOSIS — Z94.0 KIDNEY REPLACED BY TRANSPLANT (HHS-HCC): ICD-10-CM

## 2024-09-09 DIAGNOSIS — E08.22 DIABETES MELLITUS DUE TO UNDERLYING CONDITION WITH DIABETIC CHRONIC KIDNEY DISEASE, UNSPECIFIED CKD STAGE, UNSPECIFIED WHETHER LONG TERM INSULIN USE (MULTI): ICD-10-CM

## 2024-09-09 DIAGNOSIS — N52.9 ERECTILE DYSFUNCTION, UNSPECIFIED ERECTILE DYSFUNCTION TYPE: ICD-10-CM

## 2024-09-09 DIAGNOSIS — E78.5 HYPERLIPIDEMIA, UNSPECIFIED HYPERLIPIDEMIA TYPE: ICD-10-CM

## 2024-09-09 RX ORDER — ATORVASTATIN CALCIUM 20 MG/1
20 TABLET, FILM COATED ORAL NIGHTLY
Qty: 90 TABLET | Refills: 3 | Status: SHIPPED | OUTPATIENT
Start: 2024-09-09 | End: 2025-09-09

## 2024-09-09 RX ORDER — SILDENAFIL 100 MG/1
100 TABLET, FILM COATED ORAL DAILY PRN
Qty: 30 TABLET | Refills: 3 | Status: SHIPPED | OUTPATIENT
Start: 2024-09-09 | End: 2025-09-09

## 2024-09-09 NOTE — TELEPHONE ENCOUNTER
Per Dr. Painting ok to fill Viagra and atorvastatin scripts and place orders for PSA, ha1c, and lipid panel this year for the patient but to inform after this year we would prefer that he follow up with is primary care.   Called patient, rec VM, left message with Dr. Painting's instructions for the patient.   Scripts sent to Dr. Painting to sign.

## 2024-09-16 DIAGNOSIS — Z94.0 KIDNEY REPLACED BY TRANSPLANT (HHS-HCC): ICD-10-CM

## 2024-10-09 ENCOUNTER — OFFICE VISIT (OUTPATIENT)
Dept: TRANSPLANT | Facility: CLINIC | Age: 74
End: 2024-10-09
Payer: MEDICARE

## 2024-10-09 VITALS
TEMPERATURE: 97.5 F | DIASTOLIC BLOOD PRESSURE: 69 MMHG | SYSTOLIC BLOOD PRESSURE: 137 MMHG | OXYGEN SATURATION: 97 % | WEIGHT: 178.7 LBS | BODY MASS INDEX: 27.17 KG/M2 | HEART RATE: 61 BPM

## 2024-10-09 DIAGNOSIS — Z94.0 KIDNEY REPLACED BY TRANSPLANT (HHS-HCC): Primary | ICD-10-CM

## 2024-10-09 DIAGNOSIS — I10 ESSENTIAL HYPERTENSION: ICD-10-CM

## 2024-10-09 DIAGNOSIS — Z79.899 IMMUNOSUPPRESSIVE MANAGEMENT ENCOUNTER FOLLOWING KIDNEY TRANSPLANT: ICD-10-CM

## 2024-10-09 DIAGNOSIS — Z94.0 IMMUNOSUPPRESSIVE MANAGEMENT ENCOUNTER FOLLOWING KIDNEY TRANSPLANT: ICD-10-CM

## 2024-10-09 PROCEDURE — 3075F SYST BP GE 130 - 139MM HG: CPT | Performed by: INTERNAL MEDICINE

## 2024-10-09 PROCEDURE — 99214 OFFICE O/P EST MOD 30 MIN: CPT

## 2024-10-09 PROCEDURE — 3078F DIAST BP <80 MM HG: CPT | Performed by: INTERNAL MEDICINE

## 2024-10-09 PROCEDURE — 1126F AMNT PAIN NOTED NONE PRSNT: CPT | Performed by: INTERNAL MEDICINE

## 2024-10-09 RX ORDER — AMLODIPINE BESYLATE 10 MG/1
10 TABLET ORAL DAILY
Qty: 90 TABLET | Refills: 3 | Status: SHIPPED | OUTPATIENT
Start: 2024-10-09 | End: 2025-10-09

## 2024-10-09 RX ORDER — MYCOPHENOLATE MOFETIL 250 MG/1
500 CAPSULE ORAL EVERY 12 HOURS
Qty: 360 CAPSULE | Refills: 3 | Status: SHIPPED | OUTPATIENT
Start: 2024-10-09 | End: 2025-10-09

## 2024-10-09 RX ORDER — CARVEDILOL 25 MG/1
25 TABLET ORAL 2 TIMES DAILY
Qty: 180 TABLET | Refills: 3 | Status: SHIPPED | OUTPATIENT
Start: 2024-10-09 | End: 2025-10-09

## 2024-10-09 RX ORDER — TACROLIMUS 0.5 MG/1
0.5 CAPSULE ORAL EVERY 12 HOURS
Qty: 180 CAPSULE | Refills: 3 | Status: SHIPPED | OUTPATIENT
Start: 2024-10-09 | End: 2025-10-09

## 2024-10-09 SDOH — ECONOMIC STABILITY: FOOD INSECURITY: WITHIN THE PAST 12 MONTHS, YOU WORRIED THAT YOUR FOOD WOULD RUN OUT BEFORE YOU GOT MONEY TO BUY MORE.: NEVER TRUE

## 2024-10-09 SDOH — ECONOMIC STABILITY: FOOD INSECURITY: WITHIN THE PAST 12 MONTHS, THE FOOD YOU BOUGHT JUST DIDN'T LAST AND YOU DIDN'T HAVE MONEY TO GET MORE.: NEVER TRUE

## 2024-10-09 ASSESSMENT — PATIENT HEALTH QUESTIONNAIRE - PHQ9
SUM OF ALL RESPONSES TO PHQ9 QUESTIONS 1 & 2: 0
1. LITTLE INTEREST OR PLEASURE IN DOING THINGS: NOT AT ALL
2. FEELING DOWN, DEPRESSED OR HOPELESS: NOT AT ALL

## 2024-10-09 ASSESSMENT — LIFESTYLE VARIABLES
AUDIT-C TOTAL SCORE: 0
HOW OFTEN DO YOU HAVE SIX OR MORE DRINKS ON ONE OCCASION: NEVER
SKIP TO QUESTIONS 9-10: 1
HOW MANY STANDARD DRINKS CONTAINING ALCOHOL DO YOU HAVE ON A TYPICAL DAY: PATIENT DOES NOT DRINK
HOW OFTEN DO YOU HAVE A DRINK CONTAINING ALCOHOL: NEVER

## 2024-10-09 ASSESSMENT — PAIN SCALES - GENERAL: PAINLEVEL: 0-NO PAIN

## 2024-10-09 ASSESSMENT — ENCOUNTER SYMPTOMS
DEPRESSION: 0
LOSS OF SENSATION IN FEET: 0
OCCASIONAL FEELINGS OF UNSTEADINESS: 0

## 2024-10-09 NOTE — PATIENT INSTRUCTIONS
Reduce mycophenolate to 500 mg twice a day  Refilled tacrolimus, amlodipine and carvedilol today  Continue blood test/urine test every 3 months, due first week of November.  Ideal time for lab is 9-10 am BEFORE taking morning dose of tacrolimus  Next follow up in 1 year  If you have recurrent skin cancers, please notify me.   Bone density, cancer screening - per primary care  Please send lab result form August to me

## 2024-10-09 NOTE — PROGRESS NOTES
TRANSPLANT NEPHROLOGY :   OUTPATIENT CLINIC NOTE      SERVICE DATE : 10/09/2024    REASON FOR VISIT/CHIEF COMPLAINT:  S/P  TRANSPLANT SURGERY  IMMUNOSUPPRESSIVE MEDICATION MANAGEMENT  BLOOD PRESSURE MANAGEMENT    HPI:    Mr. Smith is a 74 y.o. male with past medical history significant for diabetes, hypertension ,dyslipidemia and End Stage Renal Disease secondary to presumed diabetic nephropathy and hypertensive nephrosclerosis. Patient underwent a living unrelated renal transplant on 7/11/2013. Baseline creatinine had been around 1.3-1.6.     9/2023 Patient was diagnosed with melanoma at his back s/p surgery x 2.    Patient is here for follow up s/p kidney transplant.    No recurrent melanoma or skin cancers.    Patient is doing well overall. No new complaints. Denied chest pain, SOB, MEDRANO, Palpitation. Normal urination and bowel movement. Normal gait and no weakness of arms/legs. No cough, runny nose, sore throat, cold symptoms, or rash. No hearing loss. Normal vision.No problems with his sleep, mood and function. No recent infection, hospitalization, surgery or ER visits.      ROS:  Review of  14 systems was performed system by system. See HPI. Otherwise, the symptoms were negative.    PAST MEDICAL HISTORY:  No past medical history on file.     PAST SURGICAL HISTORY:  Past Surgical History:   Procedure Laterality Date    US GUIDED ASPIRATION OF ABSCESS, HEMATOMA, CYST  7/29/2013    [place] GUIDED ASPIRATION OF ABSCESS, HEMATOMA, CYST [place] [facility] [place] [facility]    US GUIDED ASPIRATION OF ABSCESS, HEMATOMA, CYST  7/29/2013    US GUIDED ASPIRATION OF ABSCESS, HEMATOMA, CYST [place] [facility] [place]        SOCIAL HISTORY:  Social History     Socioeconomic History    Marital status: Single     Spouse name: Not on file    Number of children: Not on file    Years of education: Not on file    Highest education level: Not on file   Occupational History    Not on file   Tobacco Use    Smoking status: Never    Smokeless  tobacco: Never   Substance and Sexual Activity    Alcohol use: Not Currently    Drug use: Never    Sexual activity: Not on file   Other Topics Concern    Not on file   Social History Narrative    Not on file     Social Determinants of Health     Financial Resource Strain: Not on file   Food Insecurity: No Food Insecurity (10/9/2024)    Hunger Vital Sign     Worried About Running Out of Food in the Last Year: Never true     Ran Out of Food in the Last Year: Never true   Transportation Needs: Not on file   Physical Activity: Not on file   Stress: No Stress Concern Present (10/9/2024)    [employer] - Occupational Stress Questionnaire     Feeling of Stress : Not at all   Social Connections: Not on file   Intimate Partner Violence: Not on file   Housing Stability: Not on file       FAMILY HISTORY:  No family history on file.    MEDICATION LIST:  Current Outpatient Medications   Medication Instructions    amLODIPine (NORVASC) 10 mg, oral, Daily    atorvastatin (LIPITOR) 20 mg, oral, Nightly    carvedilol (COREG) 25 mg, oral, 2 times daily    fluocinonide (Lidex) 0.05 % ointment Topical, 2 times daily,  APPLY SPARINGLY TO AFFECTED AREA(S)     mycophenolate (CELLCEPT) 500 mg, oral, Every 12 hours    NovoLIN R Regular U100 Insulin 5 Units, subcutaneous, 3 times daily before meals, plus sliding scale    sildenafil (VIAGRA) 100 mg, oral, Daily PRN    tacrolimus (PROGRAF) 0.5 mg, oral, Every 12 hours    vit A,C and E-lutein-minerals (Ocuvite with Lutein) 300 mcg-200 mg-27 mg-2 mg tablet 1 tablet, oral, Daily       ALLERGY  No Known Allergies    PHYSICAL EXAM:    Visit Vitals  /69   Pulse 61   Temp 36.4 °C (97.5 °F) (Temporal)   Wt 81.1 kg (178 lb 11.2 oz)   SpO2 97%   BMI 27.17 kg/m²   Smoking Status Never   BSA 1.97 m²          Vital signs - reviewed. Acceptable BP at this office visit.   General Appearance - NAD, Good speech, oriented and alert  HEENT - Supple. Not pale. No jaundice. No  cervical lymphadenopathy. Pharynx and tonsils are not injected.  CVS - RRR. Normal S1/S2. No murmur, click , rub or gallop  Lungs- clear to auscultation bilaterally  Abdomen - soft , not tender, no guarding, no rigidity. No hepatosplenomegaly. Normal bowel sounds. No masses and ascites. S/P Kidney transplant .  Transplanted kidney is not tender.   Musculoskeletal /Extremities - no edema. Full ROM. No joint tenderness.   Neuro/Psych - appropriate mood and affect. Motor power V/V all extremities. CN I -XII were grossly intact.  Skin - No visible rash      LABS:    Lab Results   Component Value Date    HGB 14.5 03/05/2024    HCT 42.7 03/05/2024     03/05/2024     03/05/2024    K 4.4 03/05/2024     03/05/2024    CREATININE 1.40 03/05/2024    BUN 26 03/05/2024    CO2 26 03/05/2024     par    ASSESSMENT AND PLAN:    Mr. mSith is a 74 y.o. male  who is here for follow up s/p kidney transplant.    TRANSPLANT DATE: 7/11/2013 (Kidney)      1. ESRD S/P kidney transplant   - Creatinine last check was :  Lab Results   Component Value Date    CREATININE 1.40 03/05/2024     -Ensure adequate hydration  - Avoid nephrotoxic medications, NSAIDs, and IV contrast.    2. Immunosuppression  -Reduce MMF to 500 mg bid; discussed risks/benefits with patient in clinic. He agreed and would like to proceed.  -Continue current immunosuppression regimen.    3. Electrolytes  Lab Results   Component Value Date    GLUCOSE 85 03/05/2024    CALCIUM 9.2 03/05/2024     03/05/2024    K 4.4 03/05/2024    CO2 26 03/05/2024     03/05/2024    BUN 26 03/05/2024    CREATININE 1.40 03/05/2024     -Acceptable from last lab drawn    4. Hypertension  Blood Pressures         10/9/2024  0948             BP: 137/69          -Home  BP had been acceptable  -Encourage to monitor home BP  -Continue current anti hypertensive medication    5. Bone Mineral Disease/Osteoporosis  Lab Results   Component Value Date    CALCIUM 9.2 03/05/2024  "    -check VIT D, PTH with next lab  - Consider DEXA every 2-3 years , defer to PCP  - May consider initiation of Sensipar when PTH >300 AND Ca >8.4    6.Anemia  Lab Results   Component Value Date    HGB 14.5 03/05/2024    HCT 42.7 03/05/2024     03/05/2024     No results found for: \"IRON\", \"TIBC\", \"FERRITIN\"  -asymptomatic  - Continue to monitor  -check iron studies and ferritin. Will consider LESTER as needed.  - No indications for blood transfusion     7.Health maintenance and vaccination  - Flu shot during flu season annually  - Cancer screening is up to date per the patient    Summary  Reduce mycophenolate to 500 mg twice a day  Refilled tacrolimus, amlodipine and carvedilol today  Continue blood test/urine test every 3 months, due first week of November.  Ideal time for lab is 9-10 am BEFORE taking morning dose of tacrolimus  Next follow up in 1 year  If you have recurrent skin cancers, please notify me.   Bone density, cancer screening - per primary care    Yolanda Painting    Transplant Nephrology     "

## 2024-11-15 ENCOUNTER — TELEPHONE (OUTPATIENT)
Dept: TRANSPLANT | Facility: HOSPITAL | Age: 74
End: 2024-11-15
Payer: MEDICARE

## 2024-11-15 DIAGNOSIS — Z94.0 IMMUNOSUPPRESSIVE MANAGEMENT ENCOUNTER FOLLOWING KIDNEY TRANSPLANT: ICD-10-CM

## 2024-11-15 DIAGNOSIS — I10 ESSENTIAL HYPERTENSION: ICD-10-CM

## 2024-11-15 DIAGNOSIS — Z79.899 IMMUNOSUPPRESSIVE MANAGEMENT ENCOUNTER FOLLOWING KIDNEY TRANSPLANT: ICD-10-CM

## 2024-11-15 DIAGNOSIS — Z94.0 KIDNEY REPLACED BY TRANSPLANT (HHS-HCC): Primary | ICD-10-CM

## 2024-11-15 RX ORDER — MYCOPHENOLATE MOFETIL 250 MG/1
CAPSULE ORAL
Qty: 450 CAPSULE | Refills: 3 | Status: SHIPPED | OUTPATIENT
Start: 2024-11-15

## 2024-11-15 RX ORDER — TACROLIMUS 0.5 MG/1
0.5 CAPSULE ORAL EVERY 12 HOURS
Qty: 180 CAPSULE | Refills: 3 | Status: SHIPPED | OUTPATIENT
Start: 2024-11-15 | End: 2025-11-15

## 2024-11-19 ENCOUNTER — APPOINTMENT (OUTPATIENT)
Dept: DERMATOLOGY | Facility: CLINIC | Age: 74
End: 2024-11-19
Payer: MEDICARE

## 2024-12-10 ENCOUNTER — APPOINTMENT (OUTPATIENT)
Dept: DERMATOLOGY | Facility: CLINIC | Age: 74
End: 2024-12-10
Payer: MEDICARE

## 2024-12-10 DIAGNOSIS — D22.9 MULTIPLE BENIGN NEVI: ICD-10-CM

## 2024-12-10 DIAGNOSIS — L82.1 SEBORRHEIC KERATOSIS: ICD-10-CM

## 2024-12-10 DIAGNOSIS — L57.0 ACTINIC KERATOSIS: Primary | ICD-10-CM

## 2024-12-10 PROCEDURE — 17003 DESTRUCT PREMALG LES 2-14: CPT | Performed by: STUDENT IN AN ORGANIZED HEALTH CARE EDUCATION/TRAINING PROGRAM

## 2024-12-10 PROCEDURE — 17000 DESTRUCT PREMALG LESION: CPT | Performed by: STUDENT IN AN ORGANIZED HEALTH CARE EDUCATION/TRAINING PROGRAM

## 2024-12-10 PROCEDURE — 1159F MED LIST DOCD IN RCRD: CPT | Performed by: STUDENT IN AN ORGANIZED HEALTH CARE EDUCATION/TRAINING PROGRAM

## 2024-12-10 PROCEDURE — 99213 OFFICE O/P EST LOW 20 MIN: CPT | Performed by: STUDENT IN AN ORGANIZED HEALTH CARE EDUCATION/TRAINING PROGRAM

## 2024-12-10 ASSESSMENT — DERMATOLOGY QUALITY OF LIFE (QOL) ASSESSMENT
RATE HOW BOTHERED YOU ARE BY EFFECTS OF YOUR SKIN PROBLEMS ON YOUR ACTIVITIES (EG, GOING OUT, ACCOMPLISHING WHAT YOU WANT, WORK ACTIVITIES OR YOUR RELATIONSHIPS WITH OTHERS): 0 - NEVER BOTHERED
ARE THERE EXCLUSIONS OR EXCEPTIONS FOR THE QUALITY OF LIFE ASSESSMENT: NO
RATE HOW EMOTIONALLY BOTHERED YOU ARE BY YOUR SKIN PROBLEM (FOR EXAMPLE, WORRY, EMBARRASSMENT, FRUSTRATION): 0 - NEVER BOTHERED
RATE HOW BOTHERED YOU ARE BY SYMPTOMS OF YOUR SKIN PROBLEM (EG, ITCHING, STINGING BURNING, HURTING OR SKIN IRRITATION): 0 - NEVER BOTHERED
DATE THE QUALITY-OF-LIFE ASSESSMENT WAS COMPLETED: 67184

## 2024-12-10 ASSESSMENT — PATIENT GLOBAL ASSESSMENT (PGA): PATIENT GLOBAL ASSESSMENT: PATIENT GLOBAL ASSESSMENT:  1 - CLEAR

## 2024-12-10 ASSESSMENT — DERMATOLOGY PATIENT ASSESSMENT: DO YOU HAVE ANY NEW OR CHANGING LESIONS: NO

## 2024-12-10 ASSESSMENT — ITCH NUMERIC RATING SCALE: HOW SEVERE IS YOUR ITCHING?: 0

## 2024-12-10 NOTE — PROGRESS NOTES
Subjective     Rich Smith is a 74 y.o. male who presents for the following: Skin Check (Pt concerned about spot on his Right [employer]).     Review of Systems:  No other skin or systemic complaints other than what is documented elsewhere in the note.    The following portions of the chart were reviewed this encounter and updated as appropriate:         Skin Cancer History  No skin cancer on file.      Specialty Problems          Dermatology Problems    Basal cell carcinoma (BCC) in situ of skin    Malignant melanoma of skin    Actinic keratosis    Carcinoma in situ of skin of left upper limb, including shoulder    Carcinoma in situ of skin of other parts of face    Hemangioma of skin and subcutaneous tissue    Malignant melanoma of other part of trunk    Malignant melanoma of right upper limb, including shoulder (Multi)    Other melanin hyperpigmentation    Other seborrheic keratosis    Personal history of other malignant neoplasm of skin    Rosacea, unspecified    Scar condition and fibrosis of skin    Skin changes due to chronic exposure to nonionizing radiation, unspecified    Squamous cell carcinoma of skin of other parts of face        Objective   Well appearing patient in no apparent distress; mood and affect are within normal limits.    A full examination was performed including scalp, head, eyes, ears, nose, lips, neck, chest, axillae, abdomen, back, buttocks, bilateral upper extremities, bilateral lower extremities, hands, feet, fingers, toes, fingernails, and toenails. All findings within normal limits unless otherwise noted below.    Assessment/Plan   1. Actinic keratosis (13)  Left Forehead (2), Mid Forehead, Mid Parietal Scalp (6), Right Eyebrow, Right Preauricular Area, Right Temple, Right Zygomatic Area  Erythematous macules with gritty scale.    Destr of lesion - Left Forehead (2), Mid Forehead, Mid Parietal Scalp (6), Right Eyebrow, Right Preauricular Area, Right Temple, Right Zygomatic  Area  Complexity: simple    Destruction method: cryotherapy    Informed consent: discussed and consent obtained    Lesion destroyed using liquid nitrogen: Yes    Region frozen until ice ball extended beyond lesion: Yes    Cryotherapy cycles:  1  Outcome: patient tolerated procedure well with no complications    Post-procedure details: wound care instructions given      2. Seborrheic keratosis  Stuck on verrucous, tan-brown papules and plaques.      Seborrheic Keratoses - the benign nature of these lesions was discussed with the patient today and reassurance provided.  No treatment is medically indicated for these lesions at this time.      3. Multiple benign nevi  Scattered on the patient's face, neck, trunk, and extremities, there are multiple small, round- to oval-shaped, brown-pigmented and pink-colored, symmetric, uniform-appearing macules and dome-shaped papules     Clinically benign- to slightly atypical-appearing nevi - the clinically benign- to slightly atypical-appearing nature of the patient's nevi was discussed with the patient today.  None of the patient's nevi meet threshold for biopsy today. We emphasized the importance of performing monthly self-skin exams using the ABCDs of monitoring moles, which were reviewed with the patient today and an informational hand-out provided. We also emphasized the importance of sun avoidance and sun protection with daily sunscreen use.  The patient expressed understanding and is in agreement with this plan.

## 2025-01-06 LAB
ALBUMIN (G/DL) IN SER/PLAS EXTERNAL: 3.8 G/DL
ANION GAP IN SER/PLAS EXTERNAL: NORMAL
CALCIUM (MG/DL) IN SER/PLAS EXTERNAL: 9.1 MG/DL
CARBON DIOXIDE, TOTAL (MMOL/L) IN SER/PLAS EXTERNAL: 25 MMOL/L
CHLORIDE (MMOL/L) IN SER/PLAS EXTERNAL: 111 MMOL/L
CREATININE (MG/DL) IN SER/PLAS EXTERNAL: 1.28 MG/DL
ERYTHROCYTE DISTRIBUTION WIDTH (RATIO) BY AUTOMATED COUNT EXTERNAL: NORMAL
ERYTHROCYTE MEAN CORPUSCULAR HEMOGLOBIN (PG) BY AUTOMATED COUNT EXTERNAL: NORMAL
ERYTHROCYTE MEAN CORPUSCULAR HGB CONCENTRATION (G/DL) BY AUTOMATED EXT: NORMAL
ERYTHROCYTE MEAN CORPUSCULAR VOLUME (FL) BY AUTOMATED COUNT EXTERNAL: NORMAL
ERYTHROCYTES (10*6/UL) IN BLOOD BY AUTOMATED COUNT EXTERNAL: NORMAL
GLOMERULAR FILTRATION RATE ML/MIN/1.73 SQ M.PREDICTED EXTERNAL: 55 ML/MIN/1.73M*2
GLUCOSE (MG/DL) IN SER/PLAS EXTERNAL: 122 MG/DL
HEMATOCRIT (%) IN BLOOD BY AUTOMATED COUNT EXTERNAL: 40.9 %
HEMOGLOBIN (G/DL) IN BLOOD EXTERNAL: 13.8 G/DL
LEUKOCYTES (10*3/UL) IN BLOOD BY AUTOMATED COUNT EXTERNAL: NORMAL
NRBC (PER 100 WBCS) BY AUTOMATED COUNT EXTERNAL: NORMAL
PHOSPHORUS: 2.8
PLATELET MEAN VOLUME (FL) IN BLOOD BY AUTOMATED COUNT EXTERNAL: NORMAL
PLATELETS (10*3/UL) IN BLOOD AUTOMATED COUNT EXTERNAL: 175 X10*3/UL
POTASSIUM (MMOL/L) IN SER/PLAS EXTERNAL: 4.2 MMOL/L
RBC: 4.75
SODIUM (MMOL/L) IN SER/PLAS EXTERNAL: 142 MMOL/L
UREA NITROGEN (MG/DL) IN SER/PLAS EXTERNAL: 16 MG/DL
WBC: 5.9

## 2025-03-18 ENCOUNTER — APPOINTMENT (OUTPATIENT)
Dept: DERMATOLOGY | Facility: CLINIC | Age: 75
End: 2025-03-18
Payer: MEDICARE

## 2025-03-18 DIAGNOSIS — D22.9 MULTIPLE BENIGN NEVI: ICD-10-CM

## 2025-03-18 DIAGNOSIS — Z85.828 PERSONAL HISTORY OF OTHER MALIGNANT NEOPLASM OF SKIN: ICD-10-CM

## 2025-03-18 DIAGNOSIS — Z85.820 PERSONAL HISTORY OF MALIGNANT MELANOMA OF SKIN: ICD-10-CM

## 2025-03-18 DIAGNOSIS — L82.1 SEBORRHEIC KERATOSIS: ICD-10-CM

## 2025-03-18 DIAGNOSIS — L57.0 ACTINIC KERATOSIS: Primary | ICD-10-CM

## 2025-03-18 PROCEDURE — 17000 DESTRUCT PREMALG LESION: CPT | Performed by: STUDENT IN AN ORGANIZED HEALTH CARE EDUCATION/TRAINING PROGRAM

## 2025-03-18 PROCEDURE — 1159F MED LIST DOCD IN RCRD: CPT | Performed by: STUDENT IN AN ORGANIZED HEALTH CARE EDUCATION/TRAINING PROGRAM

## 2025-03-18 PROCEDURE — 99213 OFFICE O/P EST LOW 20 MIN: CPT | Performed by: STUDENT IN AN ORGANIZED HEALTH CARE EDUCATION/TRAINING PROGRAM

## 2025-03-18 PROCEDURE — 17003 DESTRUCT PREMALG LES 2-14: CPT | Performed by: STUDENT IN AN ORGANIZED HEALTH CARE EDUCATION/TRAINING PROGRAM

## 2025-03-18 ASSESSMENT — DERMATOLOGY QUALITY OF LIFE (QOL) ASSESSMENT
RATE HOW EMOTIONALLY BOTHERED YOU ARE BY YOUR SKIN PROBLEM (FOR EXAMPLE, WORRY, EMBARRASSMENT, FRUSTRATION): 0 - NEVER BOTHERED
RATE HOW BOTHERED YOU ARE BY EFFECTS OF YOUR SKIN PROBLEMS ON YOUR ACTIVITIES (EG, GOING OUT, ACCOMPLISHING WHAT YOU WANT, WORK ACTIVITIES OR YOUR RELATIONSHIPS WITH OTHERS): 0 - NEVER BOTHERED
ARE THERE EXCLUSIONS OR EXCEPTIONS FOR THE QUALITY OF LIFE ASSESSMENT: NO
RATE HOW BOTHERED YOU ARE BY SYMPTOMS OF YOUR SKIN PROBLEM (EG, ITCHING, STINGING BURNING, HURTING OR SKIN IRRITATION): 0 - NEVER BOTHERED
DATE THE QUALITY-OF-LIFE ASSESSMENT WAS COMPLETED: 67282

## 2025-03-18 ASSESSMENT — ITCH NUMERIC RATING SCALE: HOW SEVERE IS YOUR ITCHING?: 0

## 2025-03-18 ASSESSMENT — DERMATOLOGY PATIENT ASSESSMENT
DO YOU USE A TANNING BED: NO
ARE YOU AN ORGAN TRANSPLANT RECIPIENT: YES
HAVE YOU HAD OR DO YOU HAVE A STAPH INFECTION: NO
HAVE YOU HAD OR DO YOU HAVE VASCULAR DISEASE: NO
DO YOU HAVE ANY NEW OR CHANGING LESIONS: NO
DO YOU USE SUNSCREEN: DAILY

## 2025-03-18 ASSESSMENT — PATIENT GLOBAL ASSESSMENT (PGA): PATIENT GLOBAL ASSESSMENT: PATIENT GLOBAL ASSESSMENT:  1 - CLEAR

## 2025-03-18 NOTE — PROGRESS NOTES
Subjective     Rich Smith is a 74 y.o. male who presents for the following: Skin Check.     History of melanoma and [facility]. No lesions of concern today.     Review of Systems:  No other skin or systemic complaints other than what is documented elsewhere in the note.    The following portions of the chart were reviewed this encounter and updated as appropriate:          Skin Cancer History  -Melanoma 1: Malignant Melanoma Year Diagnosed: 2020. May. Location: Right Lateral Shoulder. Treatment(s): excision 5-20-20 Dr Liane Phanslow's Thickness: 0.2 mm   - Melanoma 2: Invasive Milignant Melanoma Year Diagnosed: 2023. May. Location: Right Superior Upper Back. Treatment(s): Excision 7/17/2023 Dr. Jeremy Granado's Thickness: 0.3 mm History of NMSC(s)/Dysplastic Nevi   -Lesion 1: Moderate Dysplastic Nevus. Year Diagnosed: 2019. January. Location: Left Upper Back. Treatment(s): Excision by Dr. Alvarez 3/19/2019 Pathology:    - Lesion 2: Superficial Basal Cell Carcinoma. Year Diagnosed: 2019. January. Page 1 of 4 Location: Right Upper Mid Back. Treatment(s): ED&C 1/29/2019 Pathology:    -Lesion 3: Mild Dysplastic Nevus. Year Diagnosed: 2019. January. Location: Right Lower Mid Back. Treatment(s): Excision by Dr. Alvarez 3/5/2019 Pathology:    -Lesion 4: Invasive squamous cell carcinoma. Year Diagnosed: 2019. October. Location: Left Forehead. 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 Year Diagnosed: 2019. October. 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 Year Diagnosed: 2019. October. Location: Left Forearm. Treatment(s): ED&C 11/19/2019 Dr. Alvarez Pathology: [id number]   -Lesion 7: Nodular and Infiltrative Basal Cell Carcinoma. Year Diagnosed: 2020. May. Location: Right Nasal dorsal wall Treatment(s): Mohs 9-8-20   Liane Pathology: [id number]   -Lesion 8: Atypical Melanocytic Hyperplasia. Year Diagnosed: 2020. November. Location: Left Mid Lower Back. Treatment(s): Excision 2/4/2021 Dr. 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 Year Diagnosed: 2022. February. Location: Left [place]. Treatment(s): Mohs 5/5/2022 Dr. Rob Pathology: [id number]   -Lesion 11: Invasive squamous cell carcinoma. Year Diagnosed: 2023. April. Location: Left Vertex. Scalp. 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 Year Diagnosed: 2023. April. Location: Left Lateral Cheek. Treatment(s): pending Pathology: [id number]   -Lesion 13: Atypical Melanocytic Hyperplasia. Year Diagnosed: 2023. April. Location: Right Upper Back. Treatment(s): Excision 5/23/2023 Dr. Alvarez Pathology: [id number]     Specialty Problems          Dermatology Problems    Basal cell carcinoma (BCC) in situ of skin    Malignant melanoma of skin    Actinic keratosis    Carcinoma in situ of skin of left upper limb, including shoulder    Carcinoma in situ of skin of other parts of face    Hemangioma of skin and subcutaneous tissue    Malignant melanoma of other part of trunk    Malignant melanoma of right upper limb, including shoulder (Multi)    Other melanin hyperpigmentation    Other seborrheic keratosis    Personal history of other malignant neoplasm of skin    Rosacea, unspecified    Scar condition and fibrosis of skin    Skin changes due to chronic exposure to nonionizing radiation, unspecified    Squamous cell carcinoma of skin of other parts of face        Objective   Well appearing patient in no apparent distress; mood and affect are within normal limits.    A focused skin examination was performed. All findings within normal limits unless otherwise noted below.    Well healed scar at site of prior treatment without evidence of recurrence.    Well healed scar at site of prior  treatment without evidence of recurrence.    Stuck on verrucous, tan-brown papules and plaques.      Scattered on the patient's face, neck, trunk, and extremities, there are multiple small, round- to oval-shaped, brown-pigmented and pink-colored, symmetric, uniform-appearing macules and dome-shaped papules     Left Preauricular Area, Mid Parietal Scalp (2), Right Forehead, Right Preauricular Area, Right Temple  Erythematous macules with gritty scale.           Assessment/Plan   Actinic keratosis (6)  Mid Parietal Scalp (2); Right Temple; Left Preauricular Area; Right Preauricular Area; Right Forehead    Lesions are due to cumulative sun damage over time and are pre-cancerous. They have a risk (although small in majority of patients) of developing into squamous cell carcinoma and therefore treatment recommendations were offered and discussed.   Treatments Discussed include LN2, photodynamic (blue light) therapy & topical chemotherapy creams, risks and benefits of each.     The risks and benefits of LN2 were reviewed including incomplete removal, crusting, blister hypo and/or hyperpigmentation, scarring and recurrence. Pt elected for LN2 today    Destr of lesion - Left Preauricular Area, Mid Parietal Scalp (2), Right Forehead, Right Preauricular Area, Right [employer]  Complexity: simple    Destruction method: cryotherapy    Informed consent: discussed and consent obtained    Lesion destroyed using liquid nitrogen: Yes    Region frozen until ice ball extended beyond lesion: Yes    Cryotherapy cycles:  1  Outcome: patient tolerated procedure well with no complications    Post-procedure details: wound care instructions given      Personal history of malignant melanoma of skin    There is no evidence of recurrence or repigmentation on clinical examination today, reassure was provided to the patient. The importance of sun protection was reviewed with the patient including the use of a broad spectrum sunscreen that protects  against both UVA/UVB rays, with ingredients such as Zinc oxide or titanium dioxide, wearing sun protective clothing and sun avoidance. ABCDEs of melanoma reviewed. Patient to f/u should they notice any new or changing pre-existing skin lesion. The patient was advised to follow up for FBSE due to history of melanoma.    Related Procedures  Follow Up In Dermatology - Established Patient    Personal history of other malignant neoplasm of skin    There is no evidence of recurrence on clinical examination today, reassurance was provided to the patient. The importance of sun protection was reviewed with the patient including the use of a broad spectrum sunscreen that protects against both UVA/UVB rays, with ingredients such as Zinc oxide or titanium dioxide, wearing sun protective clothing and sun avoidance. Warning signs of non-melanoma skin cancer were reviewed. ABCDEs of melanoma reviewed. Patient to f/u should they notice any new or changing pre-existing skin lesion    Related Procedures  Follow Up In Dermatology - Established Patient    Seborrheic keratosis    Seborrheic Keratoses - the benign nature of these lesions was discussed with the patient today and reassurance provided.  No treatment is medically indicated for these lesions at this time.      Multiple benign nevi    Clinically benign- to slightly atypical-appearing nevi - the clinically benign- to slightly atypical-appearing nature of the patient's nevi was discussed with the patient today.  None of the patient's nevi meet threshold for biopsy today. We emphasized the importance of performing monthly self-skin exams using the ABCDs of monitoring moles, which were reviewed with the patient today and an informational hand-out provided. We also emphasized the importance of sun avoidance and sun protection with daily sunscreen use.  The patient expressed understanding and is in agreement with this plan.      Follow up in 4 months for FBSE     My Fatmata,  DO  Department of Dermatology    I was present during all key portions of visit including history, exam, discussion/plan and/or procedures and directly supervised our resident during all portions of the visit, follow up care, medications and more    Harry Alvarez MD

## 2025-07-18 ENCOUNTER — APPOINTMENT (OUTPATIENT)
Dept: DERMATOLOGY | Facility: CLINIC | Age: 75
End: 2025-07-18
Payer: MEDICARE

## 2025-07-18 DIAGNOSIS — L20.9 ATOPIC DERMATITIS, UNSPECIFIED TYPE: Primary | ICD-10-CM

## 2025-07-18 DIAGNOSIS — Z85.820 PERSONAL HISTORY OF MALIGNANT MELANOMA OF SKIN: ICD-10-CM

## 2025-07-18 DIAGNOSIS — D22.9 MULTIPLE BENIGN NEVI: ICD-10-CM

## 2025-07-18 DIAGNOSIS — Z85.828 PERSONAL HISTORY OF OTHER MALIGNANT NEOPLASM OF SKIN: ICD-10-CM

## 2025-07-18 DIAGNOSIS — L82.1 SEBORRHEIC KERATOSIS: ICD-10-CM

## 2025-07-18 DIAGNOSIS — L57.0 ACTINIC KERATOSIS: ICD-10-CM

## 2025-07-18 DIAGNOSIS — D48.5 NEOPLASM OF UNCERTAIN BEHAVIOR OF SKIN: Primary | ICD-10-CM

## 2025-07-18 RX ORDER — FLUOCINONIDE 0.05 MG/G
OINTMENT TOPICAL
Qty: 60 G | Refills: 3 | Status: SHIPPED | OUTPATIENT
Start: 2025-07-18

## 2025-07-18 ASSESSMENT — DERMATOLOGY PATIENT ASSESSMENT
DO YOU USE A TANNING BED: NO
ARE YOU AN ORGAN TRANSPLANT RECIPIENT: YES
DO YOU USE SUNSCREEN: OCCASIONALLY
HAVE YOU HAD OR DO YOU HAVE VASCULAR DISEASE: NO
HAVE YOU HAD OR DO YOU HAVE A STAPH INFECTION: NO
DO YOU HAVE ANY NEW OR CHANGING LESIONS: NO

## 2025-07-18 ASSESSMENT — DERMATOLOGY QUALITY OF LIFE (QOL) ASSESSMENT
RATE HOW BOTHERED YOU ARE BY EFFECTS OF YOUR SKIN PROBLEMS ON YOUR ACTIVITIES (EG, GOING OUT, ACCOMPLISHING WHAT YOU WANT, WORK ACTIVITIES OR YOUR RELATIONSHIPS WITH OTHERS): 0 - NEVER BOTHERED
WHAT SINGLE SKIN CONDITION LISTED BELOW IS THE PATIENT ANSWERING THE QUALITY-OF-LIFE ASSESSMENT QUESTIONS ABOUT: NONE OF THE ABOVE
DATE THE QUALITY-OF-LIFE ASSESSMENT WAS COMPLETED: 67404
RATE HOW BOTHERED YOU ARE BY SYMPTOMS OF YOUR SKIN PROBLEM (EG, ITCHING, STINGING BURNING, HURTING OR SKIN IRRITATION): 0 - NEVER BOTHERED
ARE THERE EXCLUSIONS OR EXCEPTIONS FOR THE QUALITY OF LIFE ASSESSMENT: NO
RATE HOW EMOTIONALLY BOTHERED YOU ARE BY YOUR SKIN PROBLEM (FOR EXAMPLE, WORRY, EMBARRASSMENT, FRUSTRATION): 0 - NEVER BOTHERED

## 2025-07-18 ASSESSMENT — PATIENT GLOBAL ASSESSMENT (PGA): PATIENT GLOBAL ASSESSMENT: PATIENT GLOBAL ASSESSMENT:  1 - CLEAR

## 2025-07-18 ASSESSMENT — ITCH NUMERIC RATING SCALE: HOW SEVERE IS YOUR ITCHING?: 0

## 2025-07-18 NOTE — Clinical Note
Seborrheic Keratoses - the benign nature of these lesions was discussed with the patient today and reassurance provided.  No treatment is medically indicated for these lesions at this time.

## 2025-07-18 NOTE — Clinical Note
There is no evidence of recurrence or repigmentation on clinical examination today, reassure was provided to the patient. The importance of sun protection was reviewed with the patient including the use of a broad spectrum sunscreen that protects against both UVA/UVB rays, with ingredients such as Zinc oxide or titanium dioxide, wearing sun protective clothing and sun avoidance. ABCDEs of melanoma reviewed. Patient to f/u should they notice any new or changing pre-existing skin lesion. The patient was advised to follow up for FBSE due to history of melanoma.

## 2025-07-18 NOTE — PROGRESS NOTES
Subjective     Rich Smith is a 74 y.o. male who presents for the following: Skin Check (FBSE, HX of SCC. Pt would like refill for fluocinonide ointment for hands).     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    The following portions of the chart were reviewed this encounter and updated as appropriate:         Skin Cancer History  Biopsy Log Book  Biopsied Type Location Status   8/20/24 Actinic Keratosis Left Upper Back Patient/Caregiver Informed  8/23/24 7/18/25 SCC invasive Right Zygomatic Area Refer Mohs surgery   7/18/25 Melanoma invasive Breslow <0.8mm Scalp Refer to other specialty       Additional History      Specialty Problems          Dermatology Problems    Basal cell carcinoma (BCC) in situ of skin    Malignant melanoma of skin    Actinic keratosis    Carcinoma in situ of skin of left upper limb, including shoulder    Carcinoma in situ of skin of other parts of face    Hemangioma of skin and subcutaneous tissue    Malignant melanoma of other part of trunk    Malignant melanoma of right upper limb, including shoulder    Other melanin hyperpigmentation    Other seborrheic keratosis    Personal history of other malignant neoplasm of skin    Rosacea, unspecified    Scar condition and fibrosis of skin    Skin changes due to chronic exposure to nonionizing radiation, unspecified    Squamous cell carcinoma of skin of other parts of face        Objective   Well appearing patient in no apparent distress; mood and affect are within normal limits.    A full examination was performed including scalp, head, eyes, ears, nose, lips, neck, chest, axillae,  abdomen, back, buttocks, bilateral upper extremities, bilateral lower extremities, hands, feet, fingers, toes, fingernails, and toenails. All findings within normal limits unless otherwise noted below.    Assessment/Plan   Skin Exam  1. PERSONAL HISTORY OF MALIGNANT MELANOMA OF SKIN  Generalized  Well healed scar at site of prior treatment without evidence of recurrence.  There is no evidence of recurrence or repigmentation on clinical examination today, reassure was provided to the patient. The importance of sun protection was reviewed with the patient including the use of a broad spectrum sunscreen that protects against both UVA/UVB rays, with ingredients such as Zinc oxide or titanium dioxide, wearing sun protective clothing and sun avoidance. ABCDEs of melanoma reviewed. Patient to f/u should they notice any new or changing pre-existing skin lesion. The patient was advised to follow up for FBSE due to history of melanoma.  This Visit  - Follow Up In Dermatology - Established Patient  2. PERSONAL HISTORY OF OTHER MALIGNANT NEOPLASM OF SKIN  Generalized  Well healed scar at site of prior treatment without evidence of recurrence.  There is no evidence of recurrence on clinical examination today, reassurance was provided to the patient. The importance of sun protection was reviewed with the patient including the use of a broad spectrum sunscreen that protects against both UVA/UVB rays, with ingredients such as Zinc oxide or titanium dioxide, wearing sun protective clothing and sun avoidance. Warning signs of non-melanoma skin cancer were reviewed. ABCDEs of melanoma reviewed. Patient to f/u should they notice any new or changing pre-existing skin lesion  This Visit  - Follow Up In Dermatology - Established Patient  3. NEOPLASM OF UNCERTAIN BEHAVIOR OF SKIN (2)  Scalp  Darkly pigmented macule    - Lesion biopsy  Type of biopsy: tangential    Informed consent: discussed and consent obtained    Timeout: patient name,  date of birth, surgical site, and procedure verified    Procedure prep:  Patient was prepped and draped  Anesthesia: the lesion was anesthetized in a standard fashion    Anesthetic:  1% lidocaine w/ epinephrine 1-100,000 local infiltration  Instrument used: DermaBlade    Hemostasis achieved with: aluminum chloride    Outcome: patient tolerated procedure well    Post-procedure details: sterile dressing applied and wound care instructions given    Dressing type: petrolatum and bandage      Specimen 1 - Dermatopathology- DERM LAB  Differential Diagnosis: SCC vs ak  Check Margins Yes/No?:    Comments:    Dermpath Lab: Routine Histopathology (formalin-fixed tissue)  Right Zygomatic Area  Red papule    - Lesion biopsy  Type of biopsy: tangential    Informed consent: discussed and consent obtained    Timeout: patient name, date of birth, surgical site, and procedure verified    Procedure prep:  Patient was prepped and draped  Anesthesia: the lesion was anesthetized in a standard fashion    Anesthetic:  1% lidocaine w/ epinephrine 1-100,000 local infiltration  Instrument used: DermaBlade    Hemostasis achieved with: aluminum chloride    Outcome: patient tolerated procedure well    Post-procedure details: sterile dressing applied and wound care instructions given    Dressing type: petrolatum and bandage      Specimen 2 - Dermatopathology- DERM LAB  Differential Diagnosis: lentigo vs LM vs other  Check Margins Yes/No?:    Comments:    Dermpath Lab: Routine Histopathology (formalin-fixed tissue)  4. ACTINIC KERATOSIS (10)  Left Malar Cheek, Left Temple, Mid Forehead, Mid Frontal Scalp, Mid Parietal Scalp (2), Right Buccal Cheek, Right Forehead, Right Supratip of Nose, Right Zygomatic Area  Erythematous macules with gritty scale.  - Destr of lesion - Left Malar Cheek, Left Temple, Mid Forehead, Mid Frontal Scalp, Mid Parietal Scalp (2), Right Buccal Cheek, Right Forehead, Right Supratip of Nose, Right Zygomatic  Area  Complexity: simple    Destruction method: cryotherapy    Informed consent: discussed and consent obtained    Lesion destroyed using liquid nitrogen: Yes    Region frozen until ice ball extended beyond lesion: Yes    Cryotherapy cycles:  1  Outcome: patient tolerated procedure well with no complications    Post-procedure details: wound care instructions given      5. MULTIPLE BENIGN NEVI  Generalized  Scattered on the patient's face, neck, trunk, and extremities, there are multiple small, round- to oval-shaped, brown-pigmented and pink-colored, symmetric, uniform-appearing macules and dome-shaped papules   Clinically benign- to slightly atypical-appearing nevi - the clinically benign- to slightly atypical-appearing nature of the patient's nevi was discussed with the patient today.  None of the patient's nevi meet threshold for biopsy today. We emphasized the importance of performing monthly self-skin exams using the ABCDs of monitoring moles, which were reviewed with the patient today and an informational hand-out provided. We also emphasized the importance of sun avoidance and sun protection with daily sunscreen use.  The patient expressed understanding and is in agreement with this plan.    6. SEBORRHEIC KERATOSIS  Generalized  Stuck on verrucous, tan-brown papules and plaques.    Seborrheic Keratoses - the benign nature of these lesions was discussed with the patient today and reassurance provided.  No treatment is medically indicated for these lesions at this time.

## 2025-07-18 NOTE — Clinical Note
Clinically benign- to slightly atypical-appearing nevi - the clinically benign- to slightly atypical-appearing nature of the patient's nevi was discussed with the patient today.  None of the patient's nevi meet threshold for biopsy today. We emphasized the importance of performing monthly self-skin exams using the ABCDs of monitoring moles, which were reviewed with the patient today and an informational hand-out provided. We also emphasized the importance of sun avoidance and sun protection with daily sunscreen use.  The patient expressed understanding and is in agreement with this plan.

## 2025-07-18 NOTE — Clinical Note
Scattered on the patient's face, neck, trunk, and extremities, there are multiple small, round- to oval-shaped, brown-pigmented and pink-colored, symmetric, uniform-appearing macules and dome-shaped papules

## 2025-07-22 LAB
LABORATORY COMMENT REPORT: NORMAL
PATH REPORT.FINAL DX SPEC: NORMAL
PATH REPORT.GROSS SPEC: NORMAL
PATH REPORT.MICROSCOPIC SPEC OTHER STN: NORMAL
PATH REPORT.RELEVANT HX SPEC: NORMAL
PATH REPORT.TOTAL CANCER: NORMAL
PATHOLOGY SYNOPTIC REPORT: NORMAL

## 2025-07-23 DIAGNOSIS — C44.92 SCC (SQUAMOUS CELL CARCINOMA): ICD-10-CM

## 2025-07-23 DIAGNOSIS — C43.9 MALIGNANT MELANOMA OF SKIN: Primary | ICD-10-CM

## 2025-07-30 DIAGNOSIS — C43.9 MELANOMA OF SKIN (MULTI): ICD-10-CM

## 2025-07-30 NOTE — TUMOR BOARD NOTE
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    Recommendation:  Mohs surgery.    Review Multidisciplinary Cutaneous Oncology Conference recommendation with patient.  Continue routine follow up and total body skin exams with   Harry Alvarez.    Follow Up:  Harry Alvarez, Derm Surgery.      History and Physical Exam  Dermatologic History:   74 y.o. male with a biopsy of the scalp on 7/18/2025 showing a a non-ulcerated melanoma, lentigo maligna type, Breslow: 0.4 mm.        Pathology  Dermatopathology- DERM LAB: [id number]  Order: [id number]   Collected 7/18/2025 11:58       Status: Final result       Dx: Neoplasm of uncertain behavior of skin    Test Result Released: Yes (seen)       Messages: Seen    1 Result Note       1 Patient Communication       View Follow-Up Encounter      Component    FINAL DIAGNOSIS   A: SKIN, SCALP, SHAVE BIOPSY:  MALIGNANT MELANOMA, BRESLOW THICKNESS 0.4 MM, PRESENT ON THE DEEP AND PERIPHERAL MARGIN, SEE NOTE.     Note: Microscopic examination reveals a specimen that extends into the superficial dermis. There is dense solar elastosis and there is an asymmetric proliferation of nested and single atypical melanocytes throughout all layers of the epidermis with some areas of subepidermal clefting. There are nests of atypical melanocytes in the dermis. The melanocytes have mildly to moderately enlarged nuclei with moderate cytoplasm.     The clinical description for this specimen fits the diagnosis for specimen B.  Clinicopathologic correlation is recommended.       ** Electronically signed out by Mariano Quiroz MD **      Electronically  signed by Mariano Quiroz MD on 7/22/2025 at 1334 EDT        By the signature on this report, the individual or group listed as making the Final Interpretation/Diagnosis certifies that they have reviewed this case.    Clinical History    Encounter Diagnosis: Neoplasm of uncertain behavior of skin         [id number]  Collection Comments: Differential Diagnosis: SCC vs ak  Check Margins Yes/No?:    Comments:    Dermpath Lab: Routine Histopathology (formalin-fixed tissue)  Finding Region: Scalp  Specimen Objective: Darkly pigmented macule     [id number]  Collection Comments: Differential Diagnosis: lentigo vs LM vs other  Check Margins Yes/No?:    Comments:    Dermpath Lab: Routine Histopathology (formalin-fixed tissue)  Finding Region: Right Zygomatic Area  Specimen Objective: Red papule   Microscopic Description    B. Microscopic analysis shows irregular growth of keratinocytes that are associated with the epidermis and invade the dermis in isolated islands and strands.  Horn pearls and partial keratinization are present.The basal cells are poorly demarcated from the stroma.  Atypia is moderate and scattered mitoses and apoptotic cells are present.Lymphoplasmacytic inflammation is evident at the interface between tumor and dermis.         Gross Description    A:   Received in formalin is a 6 x 5 x 1 mm piece of skin.  The biopsy was bisected.  It is tan and brown in color.  It is shave in shape.  It was embedded in toto.  The specimen was inked.       B:  Received in formalin is a 5 x 5 x 1 mm piece of skin.  It is tan and brown in color.  It is shave in shape.  It was embedded in toto.  The specimen was inked.       The specimen was grossed by Ligia Herrera.    Case Summary Report   MELANOMA OF THE SKIN: Biopsy   8th Edition - Protocol posted: 3/23/2022MELANOMA OF THE SKIN: BIOPSY - All Specimens  SPECIMEN   Procedure  Biopsy, shave   Specimen Laterality  Not specified   TUMOR   Tumor Site  Skin of scalp and neck:  Scalp        Histologic Type  Lentigo maligna melanoma   Maximum Tumor (Breslow) Thickness (Millimeters)  0.4 mm   Ulceration  Not identified   Anatomic (Jorge) Level  III (melanoma fills and expands papillary dermis)   Mitotic Rate  None identified   Microsatellite(s)  Not identified   Lymphovascular Invasion  Not identified   Neurotropism  Not identified   Tumor-Infiltrating Lymphocytes  Present, nonbrisk   Tumor Regression  Not identified   MARGINS     Margin Status for Invasive Melanoma  All margins negative for invasive melanoma   Margin Status for Melanoma in situ  Melanoma in situ present at margin   Margin(s) Involved by Melanoma in Situ  Peripheral     Deep   PATHOLOGIC STAGE CLASSIFICATION (pTNM, AJCC 8th Edition)     pT Category  pT1a   .      Resulting Agency [facility] DERM LAB

## 2025-08-04 ENCOUNTER — TUMOR BOARD CONFERENCE (OUTPATIENT)
Dept: HEMATOLOGY/ONCOLOGY | Facility: HOSPITAL | Age: 75
End: 2025-08-04
Payer: MEDICARE

## 2025-09-10 ENCOUNTER — APPOINTMENT (OUTPATIENT)
Dept: DERMATOLOGY | Facility: CLINIC | Age: 75
End: 2025-09-10
Payer: MEDICARE

## 2025-09-17 ENCOUNTER — APPOINTMENT (OUTPATIENT)
Dept: DERMATOLOGY | Facility: CLINIC | Age: 75
End: 2025-09-17
Payer: MEDICARE

## 2026-01-19 ENCOUNTER — APPOINTMENT (OUTPATIENT)
Dept: DERMATOLOGY | Facility: CLINIC | Age: 76
End: 2026-01-19
Payer: MEDICARE